# Patient Record
Sex: FEMALE | Race: WHITE | Employment: OTHER | URBAN - METROPOLITAN AREA
[De-identification: names, ages, dates, MRNs, and addresses within clinical notes are randomized per-mention and may not be internally consistent; named-entity substitution may affect disease eponyms.]

---

## 2017-04-23 ENCOUNTER — HOSPITAL ENCOUNTER (INPATIENT)
Age: 52
LOS: 3 days | Discharge: HOME | DRG: 274 | End: 2017-04-26
Attending: INTERNAL MEDICINE | Admitting: INTERNAL MEDICINE
Payer: COMMERCIAL

## 2017-04-23 PROBLEM — I21.4 NSTEMI (NON-ST ELEVATED MYOCARDIAL INFARCTION) (HCC): Status: ACTIVE | Noted: 2017-04-23

## 2017-04-23 LAB — TROPONIN I SERPL-MCNC: 1.19 NG/ML

## 2017-04-23 PROCEDURE — 74011250637 HC RX REV CODE- 250/637: Performed by: INTERNAL MEDICINE

## 2017-04-23 PROCEDURE — 84484 ASSAY OF TROPONIN QUANT: CPT | Performed by: INTERNAL MEDICINE

## 2017-04-23 PROCEDURE — 65660000000 HC RM CCU STEPDOWN

## 2017-04-23 PROCEDURE — 36415 COLL VENOUS BLD VENIPUNCTURE: CPT | Performed by: INTERNAL MEDICINE

## 2017-04-23 PROCEDURE — 99218 HC RM OBSERVATION: CPT

## 2017-04-23 PROCEDURE — 74011250636 HC RX REV CODE- 250/636: Performed by: INTERNAL MEDICINE

## 2017-04-23 PROCEDURE — 93005 ELECTROCARDIOGRAM TRACING: CPT

## 2017-04-23 RX ORDER — SODIUM CHLORIDE 0.9 % (FLUSH) 0.9 %
5-10 SYRINGE (ML) INJECTION EVERY 8 HOURS
Status: DISCONTINUED | OUTPATIENT
Start: 2017-04-23 | End: 2017-04-25

## 2017-04-23 RX ORDER — ONDANSETRON 2 MG/ML
4 INJECTION INTRAMUSCULAR; INTRAVENOUS
Status: DISCONTINUED | OUTPATIENT
Start: 2017-04-23 | End: 2017-04-26 | Stop reason: HOSPADM

## 2017-04-23 RX ORDER — ATORVASTATIN CALCIUM 40 MG/1
40 TABLET, FILM COATED ORAL
Status: DISCONTINUED | OUTPATIENT
Start: 2017-04-23 | End: 2017-04-26 | Stop reason: HOSPADM

## 2017-04-23 RX ORDER — CLOPIDOGREL BISULFATE 75 MG/1
75 TABLET ORAL DAILY
Status: DISCONTINUED | OUTPATIENT
Start: 2017-04-23 | End: 2017-04-24

## 2017-04-23 RX ORDER — ACETAMINOPHEN 325 MG/1
650 TABLET ORAL
Status: DISCONTINUED | OUTPATIENT
Start: 2017-04-23 | End: 2017-04-26 | Stop reason: HOSPADM

## 2017-04-23 RX ORDER — ENOXAPARIN SODIUM 100 MG/ML
40 INJECTION SUBCUTANEOUS EVERY 24 HOURS
Status: DISCONTINUED | OUTPATIENT
Start: 2017-04-24 | End: 2017-04-24

## 2017-04-23 RX ORDER — GUAIFENESIN 100 MG/5ML
81 LIQUID (ML) ORAL
Status: ACTIVE | OUTPATIENT
Start: 2017-04-23 | End: 2017-04-24

## 2017-04-23 RX ORDER — METOPROLOL TARTRATE 25 MG/1
25 TABLET, FILM COATED ORAL EVERY 12 HOURS
Status: DISCONTINUED | OUTPATIENT
Start: 2017-04-23 | End: 2017-04-24

## 2017-04-23 RX ORDER — SODIUM CHLORIDE 0.9 % (FLUSH) 0.9 %
5-10 SYRINGE (ML) INJECTION AS NEEDED
Status: DISCONTINUED | OUTPATIENT
Start: 2017-04-23 | End: 2017-04-25

## 2017-04-23 RX ORDER — SODIUM CHLORIDE 9 MG/ML
75 INJECTION, SOLUTION INTRAVENOUS CONTINUOUS
Status: DISCONTINUED | OUTPATIENT
Start: 2017-04-23 | End: 2017-04-25

## 2017-04-23 RX ADMIN — CLOPIDOGREL BISULFATE 75 MG: 75 TABLET ORAL at 22:47

## 2017-04-23 RX ADMIN — METOPROLOL TARTRATE 25 MG: 25 TABLET ORAL at 22:47

## 2017-04-23 RX ADMIN — SODIUM CHLORIDE 75 ML/HR: 900 INJECTION, SOLUTION INTRAVENOUS at 22:40

## 2017-04-23 RX ADMIN — Medication 10 ML: at 22:47

## 2017-04-23 NOTE — IP AVS SNAPSHOT
[address] [facility] 
[phone] Patient: La Fleming MRN: [id number] :1965 You are allergic to the following No active allergies Recent Documentation Height Weight BMI Smoking Status 1.753 m 94.7 kg 30.83 kg/m2 Never Smoker Emergency Contacts Name Discharge Info Relation Home Work Mobile Morgan Duel DISCHARGE CAREGIVER [3] Spouse [3] [phone] About your hospitalization You were admitted on:  2017 You last received care in the:  [facility] CARDIO You were discharged on:  2017 Unit phone number:  [phone] Why you were hospitalized Your primary diagnosis was:  Not on File Your diagnoses also included:  Nstemi (Non-St Elevated Myocardial Infarction) ([facility]) Providers Seen During Your Hospitalizations Provider Role Specialty Primary office phone Lexy Palomino MD Attending Provider Internal Medicine [phone] Denver Moris, MD Attending Provider Internal Medicine [phone] Jessica Simpson MD Attending Provider Hospitalist [phone] Your Primary Care Physician (PCP) Primary Care Physician Office Phone Office Fax UNKNOWN, PROVIDER ** None ** ** None ** Follow-up Information Follow up With Details Comments Contact Info Schedule an appointment as soon as possible for a visit  Cardiologist in [place] in 3-4 weeks Current Discharge Medication List  
  
CONTINUE these medications which have CHANGED Dose & Instructions Dispensing Information Comments Morning Noon Evening Bedtime  
 metoprolol succinate 25 mg XL tablet Commonly known as:  TOPROL-XL What changed:   
- medication strength 
- how much to take Your last dose was: Your next dose is:    
   
   
 Dose:  25 mg Take 1 Tab by mouth daily. Quantity:  30 Tab Refills:  1 CONTINUE these medications which have NOT CHANGED Dose & Instructions Dispensing Information Comments Morning Noon Evening Bedtime FISH OIL 1,000 mg Cap Generic drug:  omega-3 fatty acids-vitamin e Your last dose was: Your next dose is:    
   
   
 Dose:  1 Cap Take 1 Cap by mouth two (2) times a day. Refills:  0  
     
   
   
   
  
 multivitamin tablet Commonly known as:  ONE A DAY Your last dose was: Your next dose is:    
   
   
 Dose:  1 Tab Take 1 Tab by mouth daily. Refills:  0 PLAVIX 75 mg Tab Generic drug:  clopidogrel Your last dose was: Your next dose is:    
   
   
 Dose:  75 mg Take 75 mg by mouth daily. Refills:  0 Potassium Citrate 15 mEq Blease Mash Your last dose was: Your next dose is: Take  by mouth. Refills:  0  
     
   
   
   
  
 RED YEAST RICE PO Your last dose was: Your next dose is: Take  by mouth two (2) times a day. Refills:  0 STOP taking these medications   
 aspirin 81 mg chewable tablet Where to Get Your Medications Information on where to get these meds will be given to you by the nurse or doctor. ! Ask your nurse or doctor about these medications  
  metoprolol succinate 25 mg XL tablet Discharge Instructions POST-EP STUDY AND ABLATION DISCHARGE INSTRUCTIONS: 
 
You had a supraventricular tachycardia (SVT) ablation by Dr. Mozelle Burkitt on 4/25. This was for a problem called AV node reentry. Do not drive, operate any machinery, or sign any legal documents for 24 hours after your procedure. You must have someone to drive you home. You may take a shower 24 hours after your cardiac procedure.   Be sure to get the dressing wet and then remove it; gently wash the area with warm soapy water. Pat dry and leave open to air. To help prevent infections, be sure to keep the cath site clean and dry. No lotions, creams, powders, ointments, etc. in the cath site for approximately 1 week. ? Do not take a tub bath, get in a hot tub or swimming pool for approximately 5 days or until the cath site is completely healed. ? No strenuous activity or heavy lifting over 20 lbs. for 7 days. ? After your procedure, some bruising or discomfort is common during the healing process. Tylenol, 1-2 tablets every 6 hours as needed, is recommended if you experience any discomfort. If you experience any signs or symptoms of infection such as fever, chills, or poorly healing incision, persistent tenderness or swelling in the groin, redness and/or warmth to the touch, numbness, significant tingling or pain at the groin site or affected extremity, rash, drainage from the site, or if the leg feels tight or swollen, call your physician right away. ? If bleeding at the site occurs, take a clean gauze pad and apply direct pressure to the groin just above the puncture site, and call your physician right away. ? If your procedure involved ablation therapy, you may feel some mild or vague chest discomfort due to delivery of heat therapy to the heart muscle. This should resolve in 1-2 days. If it gets worse or is associated with shortness of breath, dizziness, loss of consciousness, call your physician right away or call 911 if emergency medical care is needed. Discharge Orders None [facility] Announcement We are excited to announce that we are making your provider's discharge notes available to you in [employer]. You will see these notes when they are completed and signed by the physician that discharged you from your recent hospital stay.   If you have any questions or concerns about any information you see in [employer], please call the Health Information Department where you were seen or reach out to your Primary Care Provider for more information about your plan of care. Introducing [facility] [place] [facility]! Dear Miguel A Montez: 
Thank you for requesting a [employer] account. Our records indicate that you already have an active [employer] account. You can access your account anytime at [email or web address]. [email or web address] Did you know that you can access your hospital and ER discharge instructions at any time in [employer]? You can also review all of your test results from your hospital stay or ER visit. Additional Information If you have questions, please visit the Frequently Asked Questions section of the [employer] website at [email or web address]. [email or web address] Remember, [employer] is NOT to be used for urgent needs. For medical emergencies, dial 911. Now available from your iPhone and Android! General Information Please provide this summary of care documentation to your next provider. Patient Signature:  ____________________________________________________________ Date:  ____________________________________________________________  
  
Demond Larson Provider Signature:  ____________________________________________________________ Date:  ____________________________________________________________

## 2017-04-23 NOTE — IP AVS SNAPSHOT
Current Discharge Medication List  
  
CONTINUE these medications which have CHANGED Dose & Instructions Dispensing Information Comments Morning Noon Evening Bedtime  
 metoprolol succinate 25 mg XL tablet Commonly known as:  TOPROL-XL What changed:   
- medication strength 
- how much to take Your last dose was: Your next dose is:    
   
   
 Dose:  25 mg Take 1 Tab by mouth daily. Quantity:  30 Tab Refills:  1 CONTINUE these medications which have NOT CHANGED Dose & Instructions Dispensing Information Comments Morning Noon Evening Bedtime FISH OIL 1,000 mg Cap Generic drug:  omega-3 fatty acids-vitamin e Your last dose was: Your next dose is:    
   
   
 Dose:  1 Cap Take 1 Cap by mouth two (2) times a day. Refills:  0  
     
   
   
   
  
 multivitamin tablet Commonly known as:  ONE A DAY Your last dose was: Your next dose is:    
   
   
 Dose:  1 Tab Take 1 Tab by mouth daily. Refills:  0 PLAVIX 75 mg Tab Generic drug:  clopidogrel Your last dose was: Your next dose is:    
   
   
 Dose:  75 mg Take 75 mg by mouth daily. Refills:  0 Potassium Citrate 15 mEq Chanelle Buster Your last dose was: Your next dose is: Take  by mouth. Refills:  0  
     
   
   
   
  
 RED YEAST RICE PO Your last dose was: Your next dose is: Take  by mouth two (2) times a day. Refills:  0 STOP taking these medications   
 aspirin 81 mg chewable tablet Where to Get Your Medications Information on where to get these meds will be given to you by the nurse or doctor. ! Ask your nurse or doctor about these medications  
  metoprolol succinate 25 mg XL tablet

## 2017-04-23 NOTE — IP AVS SNAPSHOT
Summary of Care Report The Summary of Care report has been created to help improve care coordination. Users with access to [employer] or [address] (Web-based application) may access additional patient information including the Discharge Summary. If you are not currently a [address] user and need more information, please call the number listed below in the [address] section and ask to be connected with Medical Records. Facility Information Name Address Phone [address] [address] [phone] Patient Information Patient Name Sex  Az Sutton ([id number]) Female 1965 Discharge Information Admitting Provider Service Area Unit Courtney Mederos MD / [facility] / [phone] Discharge Provider Discharge Date/Time Discharge Disposition Destination (none) 2017 (Pending) AHR (none) Patient Language Language ENGLISH [13] Hospital Problems as of 2017  Never Reviewed Class Noted - Resolved Last Modified POA Active Problems NSTEMI (non-ST elevated myocardial infarction) ([facility] [address]  2017 - Present 2017 by Courtney Mederos MD Unknown Entered by Courtney Mederos MD  
  
Non-Hospital Problems as of 2017  Never Reviewed Class Noted - Resolved Last Modified Active Problems Palpitations  3/29/2013 - Present 3/30/2013 Entered by Raul Nash MD  
  Paroxysmal ventricular tachycardia [facility])  Unknown - Present 2013 by Chuck Valdes MD  
  Entered by Chuck Valdes MD  
  MVP (mitral valve prolapse)  Unknown - Present 2013 by Chuck Valdes MD  
  Entered by Chuck Valdes MD  
  
You are allergic to the following No active allergies Current Discharge Medication List  
  
 CONTINUE these medications which have CHANGED Dose & Instructions Dispensing Information Comments  
 metoprolol succinate 25 mg XL tablet Commonly known as:  TOPROL-XL What changed:   
- medication strength 
- how much to take Dose:  25 mg Take 1 Tab by mouth daily. Quantity:  30 Tab Refills:  1 CONTINUE these medications which have NOT CHANGED Dose & Instructions Dispensing Information Comments FISH OIL 1,000 mg Cap Generic drug:  omega-3 fatty acids-vitamin e Dose:  1 Cap Take 1 Cap by mouth two (2) times a day. Refills:  0  
   
 multivitamin tablet Commonly known as:  ONE A DAY Dose:  1 Tab Take 1 Tab by mouth daily. Refills:  0 PLAVIX 75 mg Tab Generic drug:  clopidogrel Dose:  75 mg Take 75 mg by mouth daily. Refills:  0 Potassium Citrate 15 mEq Marylee Tara Take  by mouth. Refills:  0  
   
 RED YEAST RICE PO Take  by mouth two (2) times a day. Refills:  0 STOP taking these medications Comments  
 aspirin 81 mg chewable tablet Surgery Information ID Date/Time Status Primary Surgeon All Procedures Location [id number] 4/25/2017 Complete   DILAN TOMLINSON - DO NOT SCHEDULE Follow-up Information Follow up With Details Comments Contact Info Schedule an appointment as soon as possible for a visit  Cardiologist in [place] in 3-4 weeks Discharge Instructions POST-EP STUDY AND ABLATION DISCHARGE INSTRUCTIONS: 
 
You had a supraventricular tachycardia (SVT) ablation by Dr. Karlee Hawley on 4/25. This was for a problem called AV node reentry. Do not drive, operate any machinery, or sign any legal documents for 24 hours after your procedure. You must have someone to drive you home. You may take a shower 24 hours after your cardiac procedure.   Be sure to get the dressing wet and then remove it; gently wash the area with warm soapy water. Pat dry and leave open to air. To help prevent infections, be sure to keep the cath site clean and dry. No lotions, creams, powders, ointments, etc. in the cath site for approximately 1 week. ? Do not take a tub bath, get in a hot tub or swimming pool for approximately 5 days or until the cath site is completely healed. ? No strenuous activity or heavy lifting over 20 lbs. for 7 days. ? After your procedure, some bruising or discomfort is common during the healing process. Tylenol, 1-2 tablets every 6 hours as needed, is recommended if you experience any discomfort. If you experience any signs or symptoms of infection such as fever, chills, or poorly healing incision, persistent tenderness or swelling in the groin, redness and/or warmth to the touch, numbness, significant tingling or pain at the groin site or affected extremity, rash, drainage from the site, or if the leg feels tight or swollen, call your physician right away. ? If bleeding at the site occurs, take a clean gauze pad and apply direct pressure to the groin just above the puncture site, and call your physician right away. ? If your procedure involved ablation therapy, you may feel some mild or vague chest discomfort due to delivery of heat therapy to the heart muscle. This should resolve in 1-2 days. If it gets worse or is associated with shortness of breath, dizziness, loss of consciousness, call your physician right away or call 911 if emergency medical care is needed. Chart Review Routing History No Routing History on File

## 2017-04-24 LAB
ANION GAP BLD CALC-SCNC: 11 MMOL/L (ref 5–15)
ATRIAL RATE: 89 BPM
BUN SERPL-MCNC: 9 MG/DL (ref 6–20)
BUN/CREAT SERPL: 11 (ref 12–20)
CALCIUM SERPL-MCNC: 7.7 MG/DL (ref 8.5–10.1)
CALCULATED P AXIS, ECG09: 51 DEGREES
CALCULATED R AXIS, ECG10: -3 DEGREES
CALCULATED T AXIS, ECG11: 2 DEGREES
CHLORIDE SERPL-SCNC: 112 MMOL/L (ref 97–108)
CHOLEST SERPL-MCNC: 200 MG/DL
CO2 SERPL-SCNC: 23 MMOL/L (ref 21–32)
CREAT SERPL-MCNC: 0.85 MG/DL (ref 0.55–1.02)
DIAGNOSIS, 93000: NORMAL
ERYTHROCYTE [DISTWIDTH] IN BLOOD BY AUTOMATED COUNT: 13.5 % (ref 11.5–14.5)
EST. AVERAGE GLUCOSE BLD GHB EST-MCNC: 111 MG/DL
GLUCOSE SERPL-MCNC: 89 MG/DL (ref 65–100)
HBA1C MFR BLD: 5.5 % (ref 4.2–6.3)
HCT VFR BLD AUTO: 37.6 % (ref 35–47)
HDLC SERPL-MCNC: 78 MG/DL
HDLC SERPL: 2.6 {RATIO} (ref 0–5)
HGB BLD-MCNC: 11.8 G/DL (ref 11.5–16)
LDLC SERPL CALC-MCNC: 95.4 MG/DL (ref 0–100)
LIPID PROFILE,FLP: ABNORMAL
MCH RBC QN AUTO: 27 PG (ref 26–34)
MCHC RBC AUTO-ENTMCNC: 31.4 G/DL (ref 30–36.5)
MCV RBC AUTO: 86 FL (ref 80–99)
P-R INTERVAL, ECG05: 128 MS
PLATELET # BLD AUTO: 149 K/UL (ref 150–400)
POTASSIUM SERPL-SCNC: 3.6 MMOL/L (ref 3.5–5.1)
Q-T INTERVAL, ECG07: 368 MS
QRS DURATION, ECG06: 84 MS
QTC CALCULATION (BEZET), ECG08: 447 MS
RBC # BLD AUTO: 4.37 M/UL (ref 3.8–5.2)
SODIUM SERPL-SCNC: 146 MMOL/L (ref 136–145)
TRIGL SERPL-MCNC: 133 MG/DL (ref ?–150)
TROPONIN I SERPL-MCNC: 0.58 NG/ML
TROPONIN I SERPL-MCNC: 1.02 NG/ML
TSH SERPL DL<=0.05 MIU/L-ACNC: 1.24 UIU/ML (ref 0.36–3.74)
VENTRICULAR RATE, ECG03: 89 BPM
VLDLC SERPL CALC-MCNC: 26.6 MG/DL
WBC # BLD AUTO: 5.3 K/UL (ref 3.6–11)

## 2017-04-24 PROCEDURE — 80048 BASIC METABOLIC PNL TOTAL CA: CPT | Performed by: INTERNAL MEDICINE

## 2017-04-24 PROCEDURE — 85027 COMPLETE CBC AUTOMATED: CPT | Performed by: INTERNAL MEDICINE

## 2017-04-24 PROCEDURE — 84443 ASSAY THYROID STIM HORMONE: CPT | Performed by: INTERNAL MEDICINE

## 2017-04-24 PROCEDURE — 74011250636 HC RX REV CODE- 250/636: Performed by: INTERNAL MEDICINE

## 2017-04-24 PROCEDURE — 65660000000 HC RM CCU STEPDOWN

## 2017-04-24 PROCEDURE — 84484 ASSAY OF TROPONIN QUANT: CPT | Performed by: INTERNAL MEDICINE

## 2017-04-24 PROCEDURE — 80061 LIPID PANEL: CPT | Performed by: INTERNAL MEDICINE

## 2017-04-24 PROCEDURE — 36415 COLL VENOUS BLD VENIPUNCTURE: CPT | Performed by: INTERNAL MEDICINE

## 2017-04-24 PROCEDURE — 83036 HEMOGLOBIN GLYCOSYLATED A1C: CPT | Performed by: INTERNAL MEDICINE

## 2017-04-24 PROCEDURE — 74011250637 HC RX REV CODE- 250/637: Performed by: INTERNAL MEDICINE

## 2017-04-24 RX ADMIN — SODIUM CHLORIDE 75 ML/HR: 900 INJECTION, SOLUTION INTRAVENOUS at 10:50

## 2017-04-24 RX ADMIN — Medication 10 ML: at 22:51

## 2017-04-24 RX ADMIN — SODIUM CHLORIDE 75 ML/HR: 900 INJECTION, SOLUTION INTRAVENOUS at 22:54

## 2017-04-24 RX ADMIN — METOPROLOL TARTRATE 25 MG: 25 TABLET ORAL at 09:17

## 2017-04-24 RX ADMIN — Medication 10 ML: at 03:24

## 2017-04-24 NOTE — CONSULTS
CARDIOLOGY CONSULT    Patient ID:  Patient: Wagner Ball  MRN: [id number]  Age: 45 y.o.  : 1965    Date of  Admission: 2017  8:36 PM   PCP:  Not On File [facility]    Assessment: 1. Paroxysmal SVT, symptomatic. Narrow complex tachycardia with hidden atrial activity >200 bpm.  Worse burden lately. 2. Positive troponin due to type 2 NSTEMI during prolonged tachycardia. Peak troponin 1.19. No obstructive CAD on cath in . 3. Mitral valve prolapse with MR diagnosis. 4. History of remote TIA/stroke. Says she had hypercoagulability work-up. Plan:     1. I discussed the potential benefits, risks, and alternatives to EP study with cardiac ablation. This would be preferred to the other options of pharmacologic prophylaxis (digoxin and metoprolol) and doing nothing at all. She and  will talk this over and decide whether they will pursue this here or head back [facility] on digoxin and metoprolol and arrange shortly with a local cardiologist to them. 2. Echo today. 3. Plavix alone OK for now. We discussed the rationale for dual antiplatelet therapy and antithrombotic therapy. I suspect the chance she has paroxysmal atrial fibrillation or flutter is very small. Further evaluation and treatment pending her decision-making as above. [x]       High complexity decision making was performed in this patient. Wagner Ball is a 45 y.o. female with a history of paroxysmal supraventricular tachycardia. She has had intermittent episodes for years and years. Usually episodes last a minute or so. May occur daily for a week then not for six months. Lately, her last two episodes have been longer, each lasting >1 hr.  She had chest pain, palpitations, dizziness with the last episode, came to the ER and had spontaneous termination before a drug could be given. Active, no orthopnea, PND, edema, No rocky syncope. Lives on [address].   Was due to travel to [place] this week ( to travel [facility]) but these trips have been postponed. Past Medical History:   Diagnosis Date    B12 deficiency     Congenital hip dysplasia     Hip replacement    MVP (mitral valve prolapse)     Paroxysmal supraventricular tachycardia (HCC)     Stroke [facility])         Past Surgical History:   Procedure Laterality Date    HX GI      gallbladder    HX GYN          HX HEART CATHETERIZATION  3/2013    Normal coronaries, small LAD, EF 65%, MVP, mild to mod MR    HX ORTHOPAEDIC      scoliosis surgery, L hip replacement       Social History   Substance Use Topics    Smoking status: Never Smoker    Smokeless tobacco: Not on file    Alcohol use Not on file        Family History   Problem Relation Age of Onset    Arrhythmia Father         No Known Allergies       Current Facility-Administered Medications   Medication Dose Route Frequency    clopidogrel (PLAVIX) tablet 75 mg  75 mg Oral DAILY    sodium chloride (NS) flush 5-10 mL  5-10 mL IntraVENous Q8H    sodium chloride (NS) flush 5-10 mL  5-10 mL IntraVENous PRN    0.9% sodium chloride infusion  75 mL/hr IntraVENous CONTINUOUS    acetaminophen (TYLENOL) tablet 650 mg  650 mg Oral Q4H PRN    ondansetron (ZOFRAN) injection 4 mg  4 mg IntraVENous Q4H PRN    enoxaparin (LOVENOX) injection 40 mg  40 mg SubCUTAneous Q24H    metoprolol tartrate (LOPRESSOR) tablet 25 mg  25 mg Oral Q12H    atorvastatin (LIPITOR) tablet 40 mg  40 mg Oral QHS       Review of Symptoms:  See HPI as well.   General: negative for fever, chills, sweats, weakness, weight loss   Eyes: negative for blurred vision, eye pain, loss of vision, diplopia   Ear Nose and Throat: negative for speech or swallowing difficulties   Respiratory: negative for cough, sputum production, wheezing, pleuritic pain   Gastrointestinal: negative for abdominal pain, N/V, dysphagia, change in bowel habits, bleeding   Genitourinary: negative for dysuria, hematuria, incontinence Muskuloskeletal : negative for new arthralgia, myalgia   Hematology: negative for easy bruising, bleeding  Dermatological: negative for rash, ulceration, mole change   Endocrine: negative for hot flashes or polydipsia   Neurological: negative for headache, dizziness, confusion, focal weakness, paresthesia, memory loss, gait disturbance   Psychological: negative for anxiety, depression, agitation       Objective:      Physical Exam:  Temp (24hrs), Av °F (36.7 °C), Min:97.3 °F (36.3 °C), Max:98.5 °F (36.9 °C)    Patient Vitals for the past 8 hrs:   Pulse   17 1035 69   17 0804 98   17 0800 (!) 115   17 0707 84   17 0440 72    Patient Vitals for the past 8 hrs:   Resp   17 1035 18   17 0707 18   17 0440 18    Patient Vitals for the past 8 hrs:   BP   17 1035 136/72   17 0707 134/82   17 0440 121/65        Intake/Output Summary (Last 24 hours) at 17 1113  Last data filed at 17 0440   Gross per 24 hour   Intake              850 ml   Output                0 ml   Net              850 ml       Nondiaphoretic, not in acute distress. Supple, no palpable thyromegaly. No scleral icterus, mucous membranes moist, conjuctivae pink, no xanthelasma. Unlabored, clear to auscultation bilaterally, symmetric air movement. Regular rate and rhythm, no murmur, pericardial rub, knock, or gallop. No JVD or peripheral edema. No carotid bruit. Palpable radial pulses bilaterally. Abdomen, soft, nontender, nondistended. Extremities without cyanosis or clubbing. Muscle tone and bulk normal.  Skin warm and dry. No rashes or ulcers. Neuro grossly nonfocal.  No tremor. Awake and appropriate. CARDIOGRAPHICS and STUDIES, I reviewed:    Telemetry:  SR.    ECG's reviewed. Initial shows narrow complex tachycardia 203 bpm with unclear A activity. Subsequent ECG's show SR with PAC, PVC. No preexcitation. Echo:  PENDING.        Labs:  Recent Labs 04/24/17   0424  04/23/17   2226  04/23/17   1400  04/23/17   1100   CPK   --    --    --   59   CKMB   --    --    --   <1.0   CKNDX   --    --    --   Cannot be calulated   TROIQ  1.02*  1.19*  0.71*  0.22*     Lab Results   Component Value Date/Time    Cholesterol, total 200 04/24/2017 04:24 AM    HDL Cholesterol 78 04/24/2017 04:24 AM    LDL, calculated 95.4 04/24/2017 04:24 AM    Triglyceride 133 04/24/2017 04:24 AM    CHOL/HDL Ratio 2.6 04/24/2017 04:24 AM     No results for input(s): INR, PTP, APTT in the last 72 hours. No lab exists for component: INREXT   Recent Labs      04/24/17   0424  04/23/17   1100   NA  146*  143   K  3.6  4.1   CL  112*  104   CO2  23  25   BUN  9  11   CREA  0.85  1.31*   GLU  89  135*   CA  7.7*  8.8   ALB   --   3.8   WBC  5.3  7.1   HGB  11.8  14.0   HCT  37.6  43.2   PLT  149*  207     Recent Labs      04/23/17   1100   SGOT  18   AP  69   TP  7.2   ALB  3.8   GLOB  3.4     No components found for: GLPOC  No results for input(s): PH, PCO2, PO2 in the last 72 hours.         Saritha Walton MD  4/24/2017

## 2017-04-24 NOTE — PROGRESS NOTES
Hospitalist Progress Note    NAME: Elder Clay   :  1965   MRN:  [id number]       Interim Hospital Summary: 45 y.o. female whom presented on 2017 with      Assessment / Plan:    Narrow complex tachycardia likely Paroxysmal SVT, symptomatic-now resolved  Elevated troponin peaked to 1.08 and now trending down 0.58  H/o of MVP  -cont tele monitoring  -check LDL, A1C, TSH  -on ASA and plavix. -started on metoprolol bid, on atorvastatin  -pending ECHO  -appreciate cardiology eval, plan for ablation tomorrow     Elevated lactic 3.2 -> 1.5  Likely FROM Dehydration  -creatinine at 1.31 to 0.80  -lactic resolved following 1L NS bolus at OSH  -gentle IVF     Hx of CVA  -no deficits  -continue pta plavix.     Code Status: full  Surrogate Decision Maker:   DVT Prophylaxis: lovenox  GI Prophylaxis: not indicated  Baseline: independent         Subjective:     Chief Complaint / Reason for Physician Visit  \" i am completely fine\". Discussed with RN events overnight. Review of Systems:  Symptom Y/N Comments  Symptom Y/N Comments   Fever/Chills n   Chest Pain n    Poor Appetite n   Edema n    Cough n   Abdominal Pain n    Sputum n   Joint Pain n    SOB/FERNANDES n   Pruritis/Rash n    Nausea/vomit n   Tolerating PT/OT y    Diarrhea n   Tolerating Diet y    Constipation    Other       Could NOT obtain due to:      Objective:     VITALS:   Last 24hrs VS reviewed since prior progress note.  Most recent are:  Patient Vitals for the past 24 hrs:   Temp Pulse Resp BP SpO2   17 1035 97.3 °F (36.3 °C) 69 18 136/72 100 %   17 0804 - 98 - - -   17 0800 - (!) 115 - - -   17 0707 98 °F (36.7 °C) 84 18 134/82 99 %   17 0440 98.5 °F (36.9 °C) 72 18 121/65 97 %   17 2226 98.1 °F (36.7 °C) 89 18 117/63 96 %       Intake/Output Summary (Last 24 hours) at 17 1436  Last data filed at 17 0440   Gross per 24 hour   Intake              850 ml   Output                0 ml   Net 850 ml        PHYSICAL EXAM:  General: WD, WN. Alert, cooperative, no acute distress    EENT:  EOMI. Anicteric sclerae. MMM  Resp:  CTA bilaterally, no wheezing or rales. No accessory muscle use  CV:  Regular  rhythm,  No edema  GI:  Soft, Non distended, Non tender.  +Bowel sounds  Neurologic:  Alert and oriented X 3, normal speech,   Psych:   Good insight. Not anxious nor agitated  Skin:  No rashes. No jaundice    Reviewed most current lab test results and cultures  YES  Reviewed most current radiology test results   YES  Review and summation of old records today    NO  Reviewed patient's current orders and MAR    YES  PMH/SH reviewed - no change compared to H&P  ________________________________________________________________________  Care Plan discussed with:    Comments   Patient x    Family  x    RN     Care Manager     Consultant  x Dr. Dolan Handsome team rounds were held today with , nursing, pharmacist and clinical coordinator. Patient's plan of care was discussed; medications were reviewed and discharge planning was addressed. ________________________________________________________________________  Total NON critical care TIME: 35   Minutes    Total CRITICAL CARE TIME Spent:   Minutes non procedure based      Comments   >50% of visit spent in counseling and coordination of care     ________________________________________________________________________  Buffy Farmer MD     Procedures: see electronic medical records for all procedures/Xrays and details which were not copied into this note but were reviewed prior to creation of Plan. LABS:  I reviewed today's most current labs and imaging studies.   Pertinent labs include:  Recent Labs      04/24/17   0424  04/23/17   1100   WBC  5.3  7.1   HGB  11.8  14.0   HCT  37.6  43.2   PLT  149*  207     Recent Labs      04/24/17   0424  04/23/17   1100   NA  146*  143   K  3.6  4.1   CL  112*  104 CO2  23  25   GLU  89  135*   BUN  9  11   CREA  0.85  1.31*   CA  7.7*  8.8   ALB   --   3.8   TBILI   --   0.5   SGOT   --   18   ALT   --   25       Signed: Tawanna Young MD

## 2017-04-24 NOTE — PROGRESS NOTES
She wants to pursue ablation tomorrow. We'll set this up. Will likely be an afternoon case. Will d/c metoprolol to maximize inducibility of SVT. Enoxaparin (DVT proph) and Plavix held.

## 2017-04-24 NOTE — PROGRESS NOTES
Primary Nurse Ritika Antony RN and Mell Pettit RN performed a dual skin assessment on this patient No impairment noted  Everton score is 22        [facility] SHIFT NURSING NOTE    Bedside shift change report given to [address] (oncoming nurse) by Lucia Berger (offgoing nurse). Report included the following information SBAR, Kardex, Intake/Output, MAR and Recent Results. SHIFT SUMMARY:         Admission Date 4/23/2017   Admission Diagnosis Elevated Troponin   Consults None        Consults   [] PT   [] OT   [] Speech   [] Palliative      [] Hospice    [x] Case Management   [] None   Cardiac Monitoring   [x] Yes   [] No     Antibiotics   [] Yes   [x] No   GI Prophylaxis  (Ex: Protonix, Pepcid, etc,.)   [] Yes   [x] No          DVT Prophylaxis   SCDs:             Kolby stockings:         [x] Medication (Ex: Lovenox, Eliquis, Brilinta, Coumadin,  Heparin, etc..)   [] Contraindicated   [] No VTE needed       Urinary Catheter             LDAs               Peripheral IV 04/23/17 Left Antecubital (Active)   Site Assessment Clean, dry, & intact 4/23/2017  8:38 PM   Phlebitis Assessment 0 4/23/2017  8:38 PM   Infiltration Assessment 0 4/23/2017  8:38 PM   Dressing Status Clean, dry, & intact 4/23/2017  8:38 PM   Dressing Type Transparent 4/23/2017  8:38 PM   Hub Color/Line Status Green;Capped 4/23/2017  8:38 PM                      I/Os   Intake/Output Summary (Last 24 hours) at 04/23/17 2041  Last data filed at 04/23/17 2038   Gross per 24 hour   Intake                0 ml   Output                0 ml   Net                0 ml         Activity Level           Diet Active Orders   There are no active orders of the following type(s): Diet. Purposeful Rounding every 1-2 hour?    [x] Yes    Celine Score  Total Score: 1   Bed Alarm (If score 3 or >)   [] Yes    [] Refused (See signed refusal form in chart)   Everton Score  Everton Score: 22       Everton Score (if score 14 or less)   [] PMT consult   [] Nutrition consult   [] Wound Care consult      []  Specialty bed         Influenza Vaccine                 Needs prior to discharge:   Home O2 required:    [] Yes   [x] No     If yes, how much O2 required?     Other:        Readmission Risk Assessment Tool Score Low Risk            4       Total Score        4 More than 1 Admission in calendar year        Criteria that do not apply:    Relationship with PCP    Patient Living Status    Patient Length of Stay > 5    Patient Insurance is Medicare, Medicaid or Self Pay    Charlson Comorbidity Score       Expected Length of Stay - - -   Actual Length of Stay 0

## 2017-04-24 NOTE — H&P
Hospitalist Admission Note    NAME: Uri Mojica   :  1965   MRN:  [id number]     Date/Time:  2017 9:18 PM    Patient PCP: Not On File Bsi  ________________________________________________________________________    My assessment of this patient's clinical condition and my plan of care is as follows. Assessment / Plan:  Elevated troponin 0.22-> 0.71  SVT, resolved   Hx of MVP  -Presented to OSH with chest pressure with radiation to bilateral neck and right shoulder with associated nausea, diaphoresis, and palpitations. Symptoms lasted two hours and resolved following ED arrival at [address] [place] [address]. Transferred to [facility] for rising troponin. She has a history of SVT with prior ILR. Not on BB. Takes plavix daily. Last cath in  without evidence of CAD. -Currently without symptoms. Repeat EKG. -Admit to telemetry  -trend troponin  -check LDL, A1C, TSH  -Start ASA 81mg daily, continue 75mg plavix daily. Give tonight as she missed morning meds. -start 25mg metoprolol bid, start 40mg atorvastatin qhs  -Check ECHO  -Hold on therapeutic anticoagulation for now as troponin leak suspected secondary to supply/demand mismatch in setting of SVT at OSH. DVT proph lovenox for now.  -consult to cardiology, Discussed case with Dr. Phuong Hu. Elevated lactic 3.2 -> 1.5  Dehydration  -creatinine at 1.31, baseline not known.  -in setting of SVT and dehydration  -lactic resolved following 1L NS bolus at OSH  -gentle IVF  -Follow UOP and BMP in am.    Hx of CVA  -no deficits  -continue pta plavix. Code Status: full  Surrogate Decision Maker:   DVT Prophylaxis: lovenox  GI Prophylaxis: not indicated  Baseline: independent        Subjective:   CHIEF COMPLAINT: palpitations, chest pain    HISTORY OF PRESENT ILLNESS:     Elsie Mullins is a 45 y.o.  female with reported PMH of CVA without residual deficits, MVP and SVT. She presents as a transfer from [address] [place] [address] for elevated troponin. Patient reports symptoms started at around 9:30 this morning while in a meeting. Reports having chest pressure with radiation to bilateral neck and right shoulder with associated lightheadedness, nausea, diaphoresis, and palpitations. Symptoms lasted two hours and resolved following ED arrival at [address] [place] [address]. Reports history of similar episodes for the past 6 years in the past with negative work up. She lives in [place] and says she has seen multiple cardiologists with previoius ILR for a year without events. Not currently taking a BB. Was admitted in 2013 for arrhythmia with elevated troponin and had a negative cath at that time. At [address] [place] [address] initial EKG showed SVT with rate of ~200. Initial troponin was 0.22 with repeat of 0.71. Patient reports her SVT resolved spontaneously while at [address] [place] [address] ED, Currently NSR. She received 1L NS and 25mg PO metoprolol prior to transfer to [facility]. On arrival to [facility] patient reports symptoms have been resolved since this afternoon. Vitals are stable. HR wnl. Denies CP, N/V, SOB, or palpitations. Cardiology consult placed and case discussed with Dr. Mozelle Burkitt. We were asked to admit for work up and evaluation of the above problems.      Past Medical History:   Diagnosis Date    B12 deficiency     Congenital hip dysplasia     Hip replacement    MVP (mitral valve prolapse)     Paroxysmal ventricular tachycardia ([facility] [address] 2012    Stroke [facility])         Past Surgical History:   Procedure Laterality Date    HX GI      gallbladder    HX GYN          HX HEART CATHETERIZATION  3/2013    Normal coronaries, small LAD, EF 65%, MVP, mild to mod MR    HX ORTHOPAEDIC      scoliosis surgery, L hip replacement       Social History   Substance Use Topics    Smoking status: Never Smoker    Smokeless tobacco: Not on file    Alcohol use Not on file        Family History   Problem Relation Age of Onset    Arrhythmia Father      No Known Allergies     Prior to Admission medications    Medication Sig Start Date End Date Taking? Authorizing Provider   Potassium Citrate 15 mEq TbER Take  by mouth. Billie Laguna MD   metoprolol-XL (TOPROL-XL) 50 mg XL tablet Take 1 Tab by mouth daily. 3/30/13   Lucille Rodas MD   aspirin 81 mg chewable tablet Take 1 Tab by mouth daily. 3/30/13   Lucille Rodas MD   clopidogrel (PLAVIX) 75 mg tablet Take 75 mg by mouth daily. Historical Provider   multivitamin (ONE A DAY) tablet Take 1 Tab by mouth daily. Historical Provider   omega-3 fatty acids-vitamin e (FISH OIL) 1,000 mg cap Take 1 Cap by mouth two (2) times a day. Historical Provider   RED YEAST RICE PO Take  by mouth two (2) times a day. Historical Provider       REVIEW OF SYSTEMS:     I am not able to complete the review of systems because:    The patient is intubated and sedated    The patient has altered mental status due to his acute medical problems    The patient has baseline aphasia from prior stroke(s)    The patient has baseline dementia and is not reliable historian    The patient is in acute medical distress and unable to provide information           Total of 12 systems reviewed as follows:       POSITIVE= underlined text  Negative = text not underlined  General:  fever, chills, sweats, generalized weakness, weight loss/gain,      loss of appetite   Eyes:    blurred vision, eye pain, loss of vision, double vision  ENT:    rhinorrhea, pharyngitis   Respiratory:   cough, sputum production, SOB, FERNANDES, wheezing, pleuritic pain   Cardiology:   chest pain, palpitations, orthopnea, PND, edema, syncope   Gastrointestinal:  abdominal pain , N/V, diarrhea, dysphagia, constipation, bleeding   Genitourinary:  frequency, urgency, dysuria, hematuria, incontinence   Muskuloskeletal :  arthralgia, myalgia, back pain  Hematology:  easy bruising, nose or gum bleeding, lymphadenopathy   Dermatological: rash, ulceration, pruritis, color change / jaundice  Endocrine:   hot flashes or polydipsia   Neurological:  headache, dizziness, confusion, focal weakness, paresthesia,     Speech difficulties, memory loss, gait difficulty  Psychological: Feelings of anxiety, depression, agitation    Objective:   VITALS:    Visit Vitals    Ht 5' 9\" (1.753 m)    Wt 94.7 kg (208 lb 12.4 oz)    BMI 30.83 kg/m2       PHYSICAL EXAM:    General:    Alert, cooperative, no distress, appears stated age. HEENT: Atraumatic, anicteric sclerae, pink conjunctivae     No oral ulcers, mucosa moist, throat clear, dentition fair  Neck:  Supple, symmetrical,  thyroid: non tender  Lungs:   Clear to auscultation bilaterally. No Wheezing or Rhonchi. No rales. Chest wall:  No tenderness  No Accessory muscle use. Heart:   Regular  rhythm,  No  murmur   No edema  Abdomen:   Soft, non-tender. Not distended. Bowel sounds normal  Extremities: No cyanosis. No clubbing,      Skin turgor normal, Capillary refill normal, Radial dial pulse 2+  Skin:     Not pale. Not Jaundiced  No rashes   Psych:  Good insight. Not depressed. Not anxious or agitated. Neurologic: EOMs intact. No facial asymmetry. No aphasia or slurred speech. Symmetrical strength, Sensation grossly intact.  Alert and oriented X 4.     _______________________________________________________________________  Care Plan discussed with:    Comments   Patient x    Family  x     RN     Care Manager                    Consultant:      _______________________________________________________________________  Expected  Disposition:   Home with Family x   HH/PT/OT/RN    SNF/LTC    ANÍBAL    ________________________________________________________________________  TOTAL TIME:  61 Minutes    Critical Care Provided     Minutes non procedure based      Comments    x Reviewed previous records   >50% of visit spent in counseling and coordination of care  Discussion with patient and/or family and questions answered ________________________________________________________________________  Signed: Alan Ontiveros MD    Procedures: see electronic medical records for all procedures/Xrays and details which were not copied into this note but were reviewed prior to creation of Plan. LAB DATA REVIEWED:    Recent Results (from the past 24 hour(s))   EKG, 12 LEAD, INITIAL    Collection Time: 04/23/17 10:43 AM   Result Value Ref Range    Ventricular Rate 201 BPM    Atrial Rate 208 BPM    QRS Duration 66 ms    Q-T Interval 214 ms    QTC Calculation (Bezet) 391 ms    Calculated R Axis -9 degrees    Calculated T Axis 50 degrees    Diagnosis       Undetermined rhythm , supraventricular tachycardia  Nonspecific ST abnormality  Abnormal ECG  No previous ECGs available  Confirmed by Rosibel Rees MD, --- ([id number]) on 4/23/2017 8:08:58 PM     EKG, 12 LEAD, INITIAL    Collection Time: 04/23/17 10:48 AM   Result Value Ref Range    Ventricular Rate 96 BPM    Atrial Rate 96 BPM    P-R Interval 124 ms    QRS Duration 78 ms    Q-T Interval 360 ms    QTC Calculation (Bezet) 454 ms    Calculated P Axis 56 degrees    Calculated R Axis 1 degrees    Calculated T Axis 30 degrees    Diagnosis       Normal sinus rhythm  Nonspecific ST abnormality  Abnormal ECG  When compared with ECG of 23-APR-2017 10:43,  conversion from SVT to sinus rhythm  Confirmed by Rosibel Rees MD, --- ([id number]) on 4/23/2017 8:09:30 PM     CBC WITH AUTOMATED DIFF    Collection Time: 04/23/17 11:00 AM   Result Value Ref Range    WBC 7.1 3.6 - 11.0 K/uL    RBC 5.04 3.80 - 5.20 M/uL    HGB 14.0 11.5 - 16.0 g/dL    HCT 43.2 35.0 - 47.0 %    MCV 85.7 80.0 - 99.0 FL    MCH 27.8 26.0 - 34.0 PG    MCHC 32.4 30.0 - 36.5 g/dL    RDW 13.4 11.5 - 14.5 %    PLATELET 038 182 - 449 K/uL    NEUTROPHILS 59 32 - 75 %    LYMPHOCYTES 29 12 - 49 %    MONOCYTES 10 5 - 13 %    EOSINOPHILS 1 0 - 7 %    BASOPHILS 1 0 - 1 %    ABS. NEUTROPHILS 4.2 1.8 - 8.0 K/UL    ABS.  LYMPHOCYTES 2.0 0.8 - 3.5 K/UL    ABS. MONOCYTES 0.7 0.0 - 1.0 K/UL    ABS. EOSINOPHILS 0.1 0.0 - 0.4 K/UL    ABS. BASOPHILS 0.0 0.0 - 0.1 K/UL   METABOLIC PANEL, COMPREHENSIVE    Collection Time: 04/23/17 11:00 AM   Result Value Ref Range    Sodium 143 136 - 145 mmol/L    Potassium 4.1 3.5 - 5.1 mmol/L    Chloride 104 97 - 108 mmol/L    CO2 25 21 - 32 mmol/L    Anion gap 14 5 - 15 mmol/L    Glucose 135 (H) 65 - 100 mg/dL    BUN 11 7.0 - 18.0 MG/DL    Creatinine 1.31 (H) 0.55 - 1.02 MG/DL    BUN/Creatinine ratio 8 (L) 12 - 20      GFR est AA 51 (L) >60 ml/min/1.73m2    GFR est non-AA 42 (L) >60 ml/min/1.73m2    Calcium 8.8 8.5 - 10.1 MG/DL    Bilirubin, total 0.5 0.2 - 1.0 MG/DL    ALT (SGPT) 25 14 - 63 U/L    AST (SGOT) 18 15 - 37 U/L    Alk.  phosphatase 69 45 - 117 U/L    Protein, total 7.2 6.4 - 8.2 g/dL    Albumin 3.8 3.5 - 5.0 g/dL    Globulin 3.4 2.0 - 4.0 g/dL    A-G Ratio 1.1 1.1 - 2.2     LACTIC ACID, PLASMA    Collection Time: 04/23/17 11:00 AM   Result Value Ref Range    Lactic acid 3.2 (HH) 0.4 - 2.0 MMOL/L   CK W/ CKMB & INDEX    Collection Time: 04/23/17 11:00 AM   Result Value Ref Range    CK 59 26 - 192 U/L    CK - MB <1.0 <3.6 NG/ML    CK-MB Index Cannot be calulated 0 - 2.5     TROPONIN I    Collection Time: 04/23/17 11:00 AM   Result Value Ref Range    Troponin-I, Qt. 0.22 (H) <0.08 ng/mL   TROPONIN I    Collection Time: 04/23/17  2:00 PM   Result Value Ref Range    Troponin-I, Qt. 0.71 (H) <0.08 ng/mL   LACTIC ACID, PLASMA    Collection Time: 04/23/17  2:00 PM   Result Value Ref Range    Lactic acid 1.5 0.4 - 2.0 MMOL/L

## 2017-04-24 NOTE — CARDIO/PULMONARY
C/P Rehab Note:    Chart Reviewed. Pt transferred from [facility] with chest pain and elevated troponin. Pt is a non smoker. Echo yesterday, results pending. PMH significant for:  - SVT prior ILR  - CATH 2013 (without evidence of CAD),EF 65%  - CVA  Cardiology consulted, will continue to follow.

## 2017-04-25 ENCOUNTER — ANESTHESIA EVENT (OUTPATIENT)
Dept: NON INVASIVE DIAGNOSTICS | Age: 52
DRG: 274 | End: 2017-04-25
Payer: COMMERCIAL

## 2017-04-25 ENCOUNTER — ANESTHESIA (OUTPATIENT)
Dept: NON INVASIVE DIAGNOSTICS | Age: 52
DRG: 274 | End: 2017-04-25
Payer: COMMERCIAL

## 2017-04-25 LAB
ANION GAP BLD CALC-SCNC: 7 MMOL/L (ref 5–15)
BASOPHILS # BLD AUTO: 0 K/UL (ref 0–0.1)
BASOPHILS # BLD: 1 % (ref 0–1)
BUN SERPL-MCNC: 8 MG/DL (ref 6–20)
BUN/CREAT SERPL: 9 (ref 12–20)
CALCIUM SERPL-MCNC: 8.3 MG/DL (ref 8.5–10.1)
CHLORIDE SERPL-SCNC: 111 MMOL/L (ref 97–108)
CO2 SERPL-SCNC: 27 MMOL/L (ref 21–32)
CREAT SERPL-MCNC: 0.89 MG/DL (ref 0.55–1.02)
EOSINOPHIL # BLD: 0.1 K/UL (ref 0–0.4)
EOSINOPHIL NFR BLD: 1 % (ref 0–7)
ERYTHROCYTE [DISTWIDTH] IN BLOOD BY AUTOMATED COUNT: 13.4 % (ref 11.5–14.5)
GLUCOSE SERPL-MCNC: 107 MG/DL (ref 65–100)
HCT VFR BLD AUTO: 39.3 % (ref 35–47)
HGB BLD-MCNC: 12.3 G/DL (ref 11.5–16)
LYMPHOCYTES # BLD AUTO: 44 % (ref 12–49)
LYMPHOCYTES # BLD: 2.5 K/UL (ref 0.8–3.5)
MCH RBC QN AUTO: 27.2 PG (ref 26–34)
MCHC RBC AUTO-ENTMCNC: 31.3 G/DL (ref 30–36.5)
MCV RBC AUTO: 86.9 FL (ref 80–99)
MONOCYTES # BLD: 0.5 K/UL (ref 0–1)
MONOCYTES NFR BLD AUTO: 9 % (ref 5–13)
NEUTS SEG # BLD: 2.6 K/UL (ref 1.8–8)
NEUTS SEG NFR BLD AUTO: 45 % (ref 32–75)
PLATELET # BLD AUTO: 159 K/UL (ref 150–400)
POTASSIUM SERPL-SCNC: 3.4 MMOL/L (ref 3.5–5.1)
RBC # BLD AUTO: 4.52 M/UL (ref 3.8–5.2)
SODIUM SERPL-SCNC: 145 MMOL/L (ref 136–145)
WBC # BLD AUTO: 5.7 K/UL (ref 3.6–11)

## 2017-04-25 PROCEDURE — C1894 INTRO/SHEATH, NON-LASER: HCPCS

## 2017-04-25 PROCEDURE — 74011000250 HC RX REV CODE- 250

## 2017-04-25 PROCEDURE — C1733 CATH, EP, OTHR THAN COOL-TIP: HCPCS

## 2017-04-25 PROCEDURE — 74011250637 HC RX REV CODE- 250/637: Performed by: INTERNAL MEDICINE

## 2017-04-25 PROCEDURE — 77030028698 HC BLD TISS PLSM MEDT -D

## 2017-04-25 PROCEDURE — 77030011640 HC PAD GRND REM COVD -A

## 2017-04-25 PROCEDURE — 77030010880 HC CBL EP SUPRME STJU -C

## 2017-04-25 PROCEDURE — 77030010507 HC ADH SKN DERMBND J&J -B

## 2017-04-25 PROCEDURE — 77030030806 HC PTCH ENSIT NAVX STJU -G

## 2017-04-25 PROCEDURE — 36415 COLL VENOUS BLD VENIPUNCTURE: CPT | Performed by: HOSPITALIST

## 2017-04-25 PROCEDURE — 77030004964 HC CBL EP ABLAT BSC -C

## 2017-04-25 PROCEDURE — 77030031139 HC SUT VCRL2 J&J -A

## 2017-04-25 PROCEDURE — 93306 TTE W/DOPPLER COMPLETE: CPT

## 2017-04-25 PROCEDURE — C1730 CATH, EP, 19 OR FEW ELECT: HCPCS

## 2017-04-25 PROCEDURE — 02583ZZ DESTRUCTION OF CONDUCTION MECHANISM, PERCUTANEOUS APPROACH: ICD-10-PCS | Performed by: INTERNAL MEDICINE

## 2017-04-25 PROCEDURE — 65270000029 HC RM PRIVATE

## 2017-04-25 PROCEDURE — 77030018836 HC SOL IRR NACL ICUM -A

## 2017-04-25 PROCEDURE — 77030016894 HC CBL EP DX CATH3 STJU -B

## 2017-04-25 PROCEDURE — 77030018729 HC ELECTRD DEFIB PAD CARD -B

## 2017-04-25 PROCEDURE — 80048 BASIC METABOLIC PNL TOTAL CA: CPT | Performed by: HOSPITALIST

## 2017-04-25 PROCEDURE — 74011250636 HC RX REV CODE- 250/636

## 2017-04-25 PROCEDURE — C1731 CATH, EP, 20 OR MORE ELEC: HCPCS

## 2017-04-25 PROCEDURE — 74011250636 HC RX REV CODE- 250/636: Performed by: NURSE PRACTITIONER

## 2017-04-25 PROCEDURE — 4A0234Z MEASUREMENT OF CARDIAC ELECTRICAL ACTIVITY, PERCUTANEOUS APPROACH: ICD-10-PCS | Performed by: INTERNAL MEDICINE

## 2017-04-25 PROCEDURE — 93613 INTRACARDIAC EPHYS 3D MAPG: CPT

## 2017-04-25 PROCEDURE — 77030015398 HC CBL EP EXT STJU -C

## 2017-04-25 PROCEDURE — 77010033678 HC OXYGEN DAILY

## 2017-04-25 PROCEDURE — 02K83ZZ MAP CONDUCTION MECHANISM, PERCUTANEOUS APPROACH: ICD-10-PCS | Performed by: INTERNAL MEDICINE

## 2017-04-25 PROCEDURE — 85025 COMPLETE CBC W/AUTO DIFF WBC: CPT | Performed by: HOSPITALIST

## 2017-04-25 RX ORDER — DOBUTAMINE HYDROCHLORIDE 200 MG/100ML
INJECTION INTRAVENOUS
Status: DISCONTINUED
Start: 2017-04-25 | End: 2017-04-25

## 2017-04-25 RX ORDER — LIDOCAINE HYDROCHLORIDE 10 MG/ML
1-40 INJECTION INFILTRATION; PERINEURAL
Status: DISCONTINUED | OUTPATIENT
Start: 2017-04-25 | End: 2017-04-25

## 2017-04-25 RX ORDER — MIDAZOLAM HYDROCHLORIDE 1 MG/ML
INJECTION, SOLUTION INTRAMUSCULAR; INTRAVENOUS
Status: DISCONTINUED
Start: 2017-04-25 | End: 2017-04-25

## 2017-04-25 RX ORDER — HEPARIN SODIUM 200 [USP'U]/100ML
INJECTION, SOLUTION INTRAVENOUS
Status: COMPLETED
Start: 2017-04-25 | End: 2017-04-25

## 2017-04-25 RX ORDER — SODIUM CHLORIDE 0.9 % (FLUSH) 0.9 %
5-10 SYRINGE (ML) INJECTION EVERY 8 HOURS
Status: DISCONTINUED | OUTPATIENT
Start: 2017-04-25 | End: 2017-04-26 | Stop reason: HOSPADM

## 2017-04-25 RX ORDER — HEPARIN SODIUM 200 [USP'U]/100ML
1000 INJECTION, SOLUTION INTRAVENOUS ONCE
Status: COMPLETED | OUTPATIENT
Start: 2017-04-25 | End: 2017-04-25

## 2017-04-25 RX ORDER — FENTANYL CITRATE 50 UG/ML
INJECTION, SOLUTION INTRAMUSCULAR; INTRAVENOUS AS NEEDED
Status: DISCONTINUED | OUTPATIENT
Start: 2017-04-25 | End: 2017-04-25 | Stop reason: HOSPADM

## 2017-04-25 RX ORDER — FENTANYL CITRATE 50 UG/ML
INJECTION, SOLUTION INTRAMUSCULAR; INTRAVENOUS
Status: DISCONTINUED
Start: 2017-04-25 | End: 2017-04-25

## 2017-04-25 RX ORDER — MIDAZOLAM HYDROCHLORIDE 1 MG/ML
.5-2 INJECTION, SOLUTION INTRAMUSCULAR; INTRAVENOUS
Status: DISCONTINUED | OUTPATIENT
Start: 2017-04-25 | End: 2017-04-25 | Stop reason: HOSPADM

## 2017-04-25 RX ORDER — FENTANYL CITRATE 50 UG/ML
25-50 INJECTION, SOLUTION INTRAMUSCULAR; INTRAVENOUS
Status: DISCONTINUED | OUTPATIENT
Start: 2017-04-25 | End: 2017-04-25 | Stop reason: HOSPADM

## 2017-04-25 RX ORDER — POTASSIUM CHLORIDE 750 MG/1
10 TABLET, FILM COATED, EXTENDED RELEASE ORAL
Status: COMPLETED | OUTPATIENT
Start: 2017-04-25 | End: 2017-04-25

## 2017-04-25 RX ORDER — PROPOFOL 10 MG/ML
INJECTION, EMULSION INTRAVENOUS
Status: DISCONTINUED | OUTPATIENT
Start: 2017-04-25 | End: 2017-04-25 | Stop reason: HOSPADM

## 2017-04-25 RX ORDER — SODIUM CHLORIDE 0.9 % (FLUSH) 0.9 %
5-10 SYRINGE (ML) INJECTION AS NEEDED
Status: DISCONTINUED | OUTPATIENT
Start: 2017-04-25 | End: 2017-04-26 | Stop reason: HOSPADM

## 2017-04-25 RX ORDER — HYDRALAZINE HYDROCHLORIDE 20 MG/ML
10 INJECTION INTRAMUSCULAR; INTRAVENOUS
Status: DISCONTINUED | OUTPATIENT
Start: 2017-04-25 | End: 2017-04-26 | Stop reason: HOSPADM

## 2017-04-25 RX ORDER — MIDAZOLAM HYDROCHLORIDE 1 MG/ML
INJECTION, SOLUTION INTRAMUSCULAR; INTRAVENOUS AS NEEDED
Status: DISCONTINUED | OUTPATIENT
Start: 2017-04-25 | End: 2017-04-25 | Stop reason: HOSPADM

## 2017-04-25 RX ORDER — LIDOCAINE HYDROCHLORIDE 10 MG/ML
INJECTION INFILTRATION; PERINEURAL
Status: COMPLETED
Start: 2017-04-25 | End: 2017-04-25

## 2017-04-25 RX ORDER — OXYCODONE AND ACETAMINOPHEN 5; 325 MG/1; MG/1
1 TABLET ORAL
Status: DISCONTINUED | OUTPATIENT
Start: 2017-04-25 | End: 2017-04-26 | Stop reason: HOSPADM

## 2017-04-25 RX ORDER — DOBUTAMINE HYDROCHLORIDE 200 MG/100ML
2.5-1 INJECTION INTRAVENOUS
Status: DISCONTINUED | OUTPATIENT
Start: 2017-04-25 | End: 2017-04-25

## 2017-04-25 RX ORDER — POTASSIUM CHLORIDE 7.45 MG/ML
10 INJECTION INTRAVENOUS
Status: DISCONTINUED | OUTPATIENT
Start: 2017-04-25 | End: 2017-04-25

## 2017-04-25 RX ADMIN — MIDAZOLAM HYDROCHLORIDE 2 MG: 1 INJECTION, SOLUTION INTRAMUSCULAR; INTRAVENOUS at 13:49

## 2017-04-25 RX ADMIN — LIDOCAINE HYDROCHLORIDE 10 ML: 10 INJECTION INFILTRATION; PERINEURAL at 14:02

## 2017-04-25 RX ADMIN — LIDOCAINE HYDROCHLORIDE 10 ML: 10 INJECTION, SOLUTION INFILTRATION; PERINEURAL at 14:02

## 2017-04-25 RX ADMIN — POTASSIUM CHLORIDE 10 MEQ: 750 TABLET, FILM COATED, EXTENDED RELEASE ORAL at 21:13

## 2017-04-25 RX ADMIN — PROPOFOL 100 MCG/KG/MIN: 10 INJECTION, EMULSION INTRAVENOUS at 13:36

## 2017-04-25 RX ADMIN — Medication 10 ML: at 06:44

## 2017-04-25 RX ADMIN — Medication 10 ML: at 21:13

## 2017-04-25 RX ADMIN — FENTANYL CITRATE 50 MCG: 50 INJECTION, SOLUTION INTRAMUSCULAR; INTRAVENOUS at 14:01

## 2017-04-25 RX ADMIN — ACETAMINOPHEN 650 MG: 325 TABLET, FILM COATED ORAL at 22:54

## 2017-04-25 RX ADMIN — POTASSIUM CHLORIDE 10 MEQ: 10 INJECTION, SOLUTION INTRAVENOUS at 12:33

## 2017-04-25 RX ADMIN — HEPARIN SODIUM 1000 UNITS: 200 INJECTION, SOLUTION INTRAVENOUS at 13:54

## 2017-04-25 RX ADMIN — FENTANYL CITRATE 50 MCG: 50 INJECTION, SOLUTION INTRAMUSCULAR; INTRAVENOUS at 13:49

## 2017-04-25 RX ADMIN — Medication 10 ML: at 17:01

## 2017-04-25 NOTE — CARDIO/PULMONARY
Chart Reviewed. Pt transferred from [facility] with chest pain and elevated troponin. Pt is a non smoker. Echo yesterday, results pending.      PMH significant for:  - SVT prior ILR  - CATH 2013 (without evidence of CAD), EF 55%  - CVA  Pt with NSTEMI. Pending ablation for SVT, symtomatic today. Pt visited,  at the bedside. Pt given printed teaching materials on MI and the cardiac diet. Instruction given on symptom identification of MI and the importance of prompt treatment. Pt denies having an MI. Informed her troponin was released in the blood stream indicating the heart was under stress, related to SVT. Discussed checking weight every am and calling MD if weight is up 2-3 lbs in a day or 5 lbs in a week (or as directed by the physician). Also discussed the cardiac diet (low NA/fat/chol). Reviewed progressive return to activity as tolerated with frequent rest periods as needed, taking medications as prescribed. Discussed balation proecure and what is to be expected. Also discussed activity restrictions post procedure. Will follow post ablation.

## 2017-04-25 NOTE — ROUTINE PROCESS
Patient to go for ECHO. Received report from [address].  Patient comfortable

## 2017-04-25 NOTE — PROGRESS NOTES
Bedside and Verbal shift change report given to Herminia Fu (oncoming nurse) by Juan J Mesa RN (offgoing nurse). Report included the following information SBAR, Procedure Summary, Intake/Output, MAR and Cardiac Rhythm NSR.

## 2017-04-25 NOTE — PROGRESS NOTES
Hospitalist Progress Note    NAME: Zena Whitman   :  1965   MRN:  [id number]         Assessment / Plan:    Paroxysmal SVT, symptomatic POA resolved  Elevated troponin peaked to 1.08 and now trending down 0.58 POA  H/o of MVP  Cont tele monitoring  Check LDL, A1C, TSH  ASA and plavix, holding for now  metoprolol bid, on atorvastatin  ECHO LVEF 55 to 60% without regional wall motion changes, normal RV size and function, no AS, trivial MR, no MS  Ablation today, D/C when cleared by cardiology     Elevated lactic 3.2 -> 1.5  Hypovolemia POA  Creatinine 1.31 to 0.80  Lactic acid normalized following 1L NS bolus at OSH  Gentle IVF     Hx of CVA  no deficits  continue plavix at discharge     Code Status: full  Surrogate Decision Maker:   DVT Prophylaxis: lovenox  GI Prophylaxis: not indicated  Baseline: independent         Subjective:     Chief Complaint / Reason for Physician Visit  \"I am waiting to get the ablation done\". No complaints  Discussed with RN events overnight. Review of Systems:  Symptom Y/N Comments  Symptom Y/N Comments   Fever/Chills n   Chest Pain n    Poor Appetite    Edema     Cough n   Abdominal Pain n    Sputum    Joint Pain     SOB/FERNANDES n   Pruritis/Rash     Nausea/vomit n   Tolerating PT/OT y    Diarrhea n   Tolerating Diet NPO    Constipation    Other       Could NOT obtain due to:      Objective:     VITALS:   Last 24hrs VS reviewed since prior progress note.  Most recent are:  Patient Vitals for the past 24 hrs:   Temp Pulse Resp BP SpO2   17 1115 98.3 °F (36.8 °C) 82 18 (!) 150/96 99 %   17 0830 98.5 °F (36.9 °C) 93 18 158/88 94 %   17 0207 97.5 °F (36.4 °C) 76 18 145/77 98 %   17 2306 98.3 °F (36.8 °C) 80 18 146/84 99 %   17 1912 98.1 °F (36.7 °C) 77 18 134/75 100 %   17 1535 98.3 °F (36.8 °C) 72 18 134/62 100 %       Intake/Output Summary (Last 24 hours) at 17 1245  Last data filed at 17 1231   Gross per 24 hour   Intake 2388.75 ml   Output                0 ml   Net          2388.75 ml        PHYSICAL EXAM:  General: WD, WN. Alert, cooperative, no acute distress    EENT:  EOMI. Anicteric sclerae. MMM  Resp:  CTA bilaterally, no wheezing or rales. No accessory muscle use  CV:  Regular  rhythm,  No edema  GI:  Soft, Non distended, Non tender.  +Bowel sounds  Neurologic:  Alert and oriented X 3, normal speech,   Psych:   Good insight. Not anxious nor agitated  Skin:  No rashes. No jaundice    Reviewed most current lab test results and cultures  YES  Reviewed most current radiology test results   YES  Review and summation of old records today    NO  Reviewed patient's current orders and MAR    YES  PMH/SH reviewed - no change compared to H&P  ________________________________________________________________________  Care Plan discussed with:    Comments   Patient x    Family  x    RN     Care Manager     Consultant                        Multidiciplinary team rounds were held today with , nursing, pharmacist and clinical coordinator. Patient's plan of care was discussed; medications were reviewed and discharge planning was addressed. ________________________________________________________________________  Total NON critical care TIME: 15   Minutes    Total CRITICAL CARE TIME Spent:   Minutes non procedure based      Comments   >50% of visit spent in counseling and coordination of care     ________________________________________________________________________  Michelle Baer MD     Procedures: see electronic medical records for all procedures/Xrays and details which were not copied into this note but were reviewed prior to creation of Plan. LABS:  I reviewed today's most current labs and imaging studies.   Pertinent labs include:  Recent Labs      04/25/17   0218  04/24/17   0424  04/23/17   1100   WBC  5.7  5.3  7.1   HGB  12.3  11.8  14.0   HCT  39.3  37.6  43.2   PLT  159  149*  207 Recent Labs      04/25/17   0218  04/24/17   0424  04/23/17   1100   NA  145  146*  143   K  3.4*  3.6  4.1   CL  111*  112*  104   CO2  27  23  25   GLU  107*  89  135*   BUN  8  9  11   CREA  0.89  0.85  1.31*   CA  8.3*  7.7*  8.8   ALB   --    --   3.8   TBILI   --    --   0.5   SGOT   --    --   18   ALT   --    --   25       Signed: Dutch Arora MD

## 2017-04-25 NOTE — ANESTHESIA POSTPROCEDURE EVALUATION
Post-Anesthesia Evaluation and Assessment    Patient: Kana Pennington MRN: [id number]  SSN: [id number]    YOB: 1965  Age: 45 y.o. Sex: female       Cardiovascular Function/Vital Signs  Visit Vitals    BP (!) 147/94    Pulse 79    Temp 36.8 °C (98.2 °F)    Resp 16    Ht 5' 9\" (1.753 m)    Wt 94.7 kg (208 lb 12.4 oz)    SpO2 100%    BMI 30.83 kg/m2       Patient is status post total IV anesthesia, MAC anesthesia for * No procedures listed *. Nausea/Vomiting: None    Postoperative hydration reviewed and adequate. Pain:  Pain Scale 1: Numeric (0 - 10) (04/25/17 1504)  Pain Intensity 1: 0 (04/25/17 1504)   Managed    Neurological Status: At baseline    Mental Status and Level of Consciousness: Arousable    Pulmonary Status:   O2 Device: Nasal cannula (04/25/17 1504)   Adequate oxygenation and airway patent    Complications related to anesthesia: None    Post-anesthesia assessment completed.  No concerns    Signed By: Júnior Song MD     April 25, 2017

## 2017-04-25 NOTE — PROCEDURES
[address]  [phone]    Patient ID:  Patient: Astrid Mondragon  MRN: [id number]  Age: 45 y.o.  : 1965  Gender: female  Study Date: 2017    History:  This is a female with paroxysmal SVT with symptoms, here for elective EP study and cardiac ablation. She has had a regular narrow complex tachycardia of just over 200 bpm documented on ECG. Procedures Performed:   1. Comprehensive EP study with attempted induction of arrhythmia (00846)   2. Left atrial pacing and recording from coronary sinus and left atrium (02365-18)   3. Intracardiac electrophysiologic 3-D mapping (89962)    The patient was brought to EP lab in NPO state and after informed consent.  Continuous ECG and hemodynamic monitoring was performed.  Sedation was by the anesthesiologist who was in constant attendance and supervision throughout the procedure.  The right groin was anesthetized with 1% lidocaine and access obtained (3 safe sheaths in the right femoral vein). An 8Fr sheath on the right was changed to SR-0 long support sheath later in the case.  A deflectable duodecapolar catheter was advanced into the coronary sinus and left atrial pacing and recordings were obtained. Quadripolar catheters were positioned in the low septal RA and RV apex as needed.  An 8 Fr 5 mm tip deflectable mapping and ablation catheter was used later in the case. A comprehensive EP study performed including both atrial and ventricular burst and extrastimulus pacing was performed. 3D mapping was using the Subtext 61 system. At the end of the case, the sheaths were removed and hemostasis obtained. Preoperative Diagnosis: As above. Postoperative Diagnosis: As above. Procedure:  As above. Surgeon(s) and Role:  Ana Berrios MD - Primary   Anesthesia:   MAC by the anesthesiologist.  Estimated Blood Loss:  <5 cc.   Specimens: * No specimens in log *   Findings:  As below. Complications:  None. Ablation therapy:  5 treatments were given to total of 1.98 minutes. Fluoroscopy time:  4.9 minutes  Case time:  45 minutes    Findings:  1.  At baseline, sinus rhythm with normal intervals was present.  (, , QRS 76, and  ms).   The AH and HV intervals were 65 and 44 ms, respectively. 2.  Grossly, normal sinus node function.  No pauses. 3.  Antegrade conduction was midline and decremental without preexcitation.  WBCL 290 ms. Dual AVN physiology was noted during rapid atrial pacing as evidenced by induction also of what appeared to be AV node reentry. The fast and slow pathway could not be distinguished due to brisk fast pathway function. AVNERP pacing at 330 ms was 250 ms, and the AERP was 500/220 ms. 4.  Retrograde conduction was midline and decremental.  Dual physiology was not noted. The retrograde WBCL was 250 ms. The VAERP was less than or equal to 400/220 ms and the VERP was 400/220 ms.  5.  Tachycardia was inducible during rapid atrial pacing around 300 ms from the right atrium. A regular, narrow complex tachycardia (cycle length 320 ms) with very short RP and 1:1 AV relationship was induced. 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 Initiation was with significant NJ prolongation immediately with very short VA relationship. The tachycardia was nonsustained, so was not around long enough to entrain. This finding correlated with the historical SVT, so a slow pathway modification was performed. 6.  Using 3D mapping to facilitate precision of ablation therapy, an 8Fr 5mm tip mapping and ablation catheter was used to ablate in the slow pathway region. During therapy, JET was seen. Tachycardia was not inducible after therapy. Fast pathway function was not significantly affected.   The WBCL after ablation was around 320 ms.  7.  Sheaths were removed at the end of the case and hemostasis was easily obtained. Impression:  1.  Successful modification of slow pathway for ablation of common AVNRT. After therapy, SVT was not inducible. 2.  Grossly normal sinus node function. 3.  Normal antegrade conduction without preexcitation or infra-His block. Dual AVN physiology was identified. 4.  Retrograde conduction was normal.    RECS:  After successful SVT ablation for AVNRT, follow-up with in 4-6 weeks in clinic.

## 2017-04-25 NOTE — PROGRESS NOTES
Cardiopulmonary Care Interdisciplinary rounds were held today to discuss patient plan of care and outcomes. The following members were present: PT, NP/Physician, Pharmacy, Nursing, Nutritionist and Case Management.       Plan of Care: Continue current treatment plan; Replete potassium; Ablation scheduled for this afternoon

## 2017-04-25 NOTE — PROGRESS NOTES
TRANSFER - OUT REPORT:    Verbal report given to Judah (name) on [place]  being transferred to [facility](unit) for routine progression of care       Report consisted of patients Situation, Background, Assessment and   Recommendations(SBAR). Information from the following report(s) SBAR was reviewed with the receiving nurse. Lines:   Saline Lock 04/25/17 Right Wrist (Active)   Site Assessment Clean, dry, & intact 4/25/2017 12:33 PM   Phlebitis Assessment 0 4/25/2017 12:33 PM   Infiltration Assessment 0 4/25/2017 12:33 PM   Hub Color/Line Status Blue 4/25/2017 12:33 PM        Opportunity for questions and clarification was provided.       Patient transported with:   Monitor  Patient to go to the [facility] after the EPS procedure

## 2017-04-25 NOTE — ANESTHESIA PREPROCEDURE EVALUATION
Anesthetic History   No history of anesthetic complications            Review of Systems / Medical History  Patient summary reviewed, nursing notes reviewed and pertinent labs reviewed    Pulmonary  Within defined limits                 Neuro/Psych       CVA  TIA     Cardiovascular            Dysrhythmias : SVT  Past MI and CAD    Exercise tolerance: >4 METS  Comments: Ejection fraction was  estimated in the range of 55 % to 60 %.     MVP   GI/Hepatic/Renal  Within defined limits              Endo/Other  Within defined limits           Other Findings            Physical Exam    Airway  Mallampati: II  TM Distance: 4 - 6 cm  Neck ROM: normal range of motion   Mouth opening: Normal     Cardiovascular  Regular rate and rhythm,  S1 and S2 normal,  no murmur, click, rub, or gallop             Dental  No notable dental hx       Pulmonary  Breath sounds clear to auscultation               Abdominal  GI exam deferred       Other Findings            Anesthetic Plan    ASA: 2  Anesthesia type: total IV anesthesia and MAC          Induction: Intravenous  Anesthetic plan and risks discussed with: Patient

## 2017-04-25 NOTE — PROGRESS NOTES
S/p EP study revealing nonsustained AV node reentrant tachycardia (AVNRT). Slow pathway modification performed with RFA. No inducible SVT afterward. No immediate complications.

## 2017-04-25 NOTE — PROGRESS NOTES
[address] SHIFT NURSING NOTE    Bedside shift change report given to [place] RN (oncoming nurse) by Jorge Kendrick RN (offgoing nurse). Report included the following information SBAR, ED Summary, Procedure Summary, Recent Results, Med Rec Status and Cardiac Rhythm NSR.    SHIFT SUMMARY: Pt comfortable throughout shift - NPO after midnight for scheduled Ablation. Consent for Blood products/Transfusion of Blood and Cardiac operation/procedure completed and placed in patient chart. Admission Date 4/23/2017   Admission Diagnosis Elevated Troponin  NSTEMI (non-ST elevated myocardial infarction) ([facility] [address]   Consults IP CONSULT TO CARDIOLOGY        Consults   [] PT   [] OT   [] Speech   [] Palliative      [] Hospice    [] Case Management   [x] None   Cardiac Monitoring   [x] Yes   [] No     Antibiotics   [] Yes   [x] No   GI Prophylaxis  (Ex: Protonix, Pepcid, etc,.)   [] Yes   [x] No          DVT Prophylaxis   SCDs:             Kolby stockings:         [] Medication (Ex: Lovenox, Eliquis, Brilinta, Coumadin,  Heparin, etc..)   [] Contraindicated   [] No VTE needed       Urinary Catheter             LDAs               Peripheral IV 04/23/17 Left Antecubital (Active)   Site Assessment Clean, dry, & intact 4/25/2017  2:21 AM   Phlebitis Assessment 0 4/25/2017  2:21 AM   Infiltration Assessment 0 4/25/2017  2:21 AM   Dressing Status Clean, dry, & intact 4/25/2017  2:21 AM   Dressing Type Transparent 4/25/2017  2:21 AM   Hub Color/Line Status Green; Infusing 4/25/2017  2:21 AM                      I/Os   Intake/Output Summary (Last 24 hours) at 04/25/17 0248  Last data filed at 04/24/17 2019   Gross per 24 hour   Intake             1110 ml   Output                0 ml   Net             1110 ml         Activity Level Activity Level: Up ad alisia     Activity Assistance: No assistance needed   Diet Active Orders   Diet    DIET NPO      Purposeful Rounding every 1-2 hour?    [x] Yes    Celine Score  Total Score: 1   Bed Alarm (If score 3 or >)   [] Yes    [] Refused (See signed refusal form in chart)   Everton Score  Everton Score: 22       Everton Score (if score 14 or less)   [] PMT consult   [] Nutrition consult   [] Wound Care consult      []  Specialty bed         Influenza Vaccine Received Flu Vaccine for Current Season (usually Sept-March): Not Flu Season               Needs prior to discharge:   Home O2 required:    [] Yes   [] No     If yes, how much O2 required?     Other:    Last Bowel Movement Date: 04/24/17   Readmission Risk Assessment Tool Score Low Risk            8       Total Score        2 Patient Living Status    4 More than 1 Admission in calendar year    2 Charlson Comorbidity Score        Criteria that do not apply:    Relationship with PCP    Patient Length of Stay > 5    Patient Insurance is Medicare, Medicaid or Self Pay       Expected Length of Stay 2d 19h   Actual Length of Stay 2

## 2017-04-25 NOTE — PROGRESS NOTES
Spiritual Care Partner Volunteer visited patient in [facility] on 4/25/17. Documented by:    Dania Awad M.S.   Spiritual Care Department  If need arise please call GISEL [phone])

## 2017-04-25 NOTE — PROGRESS NOTES
0730    [facility] SBAR, Kardex, ED Summary, Procedure Summary, Intake/Output, MAR, Accordion, Recent Results, Med Rec Status and Cardiac Rhythm NSR to ST were discussed. Cass Mena        [address] SHIFT [address]URSING NOTE    Bedside shift change report given to [address] (oncoming nurse) by Thelma Cheney (offgoing nurse). Report included the following information SBAR, Kardex, Procedure Summary, Intake/Output, MAR, Accordion, Recent Results, Med Rec Status and Cardiac Rhythm NSR.    SHIFT SUMMARY: Patient is going for an ablation tomorrow afternoon. Admission Date 4/23/2017   Admission Diagnosis Elevated Troponin  NSTEMI (non-ST elevated myocardial infarction) ([facility] [address]   Consults IP CONSULT TO CARDIOLOGY        Consults   [] PT   [] OT   [] Speech   [] Palliative      [] Hospice    [] Case Management   [] None   Cardiac Monitoring   [x] Yes   [] No     Antibiotics   [] Yes   [x] No   GI Prophylaxis  (Ex: Protonix, Pepcid, etc,.)   [] Yes   [x] No          DVT Prophylaxis   SCDs:             Kolby stockings:         [] Medication (Ex: Lovenox, Eliquis, Brilinta, Coumadin,  Heparin, etc..)   [] Contraindicated   [] No VTE needed       Urinary Catheter             LDAs               Peripheral IV 04/23/17 Left Antecubital (Active)   Site Assessment Clean, dry, & intact 4/24/2017  4:00 PM   Phlebitis Assessment 0 4/24/2017  4:00 PM   Infiltration Assessment 0 4/24/2017  4:00 PM   Dressing Status Clean, dry, & intact 4/24/2017  4:00 PM   Dressing Type Tape;Transparent 4/24/2017  4:00 PM   Hub Color/Line Status Green; Infusing 4/24/2017  4:00 PM                      I/Os   Intake/Output Summary (Last 24 hours) at 04/24/17 2014  Last data filed at 04/24/17 0800   Gross per 24 hour   Intake             1100 ml   Output                0 ml   Net             1100 ml         Activity Level Activity Level: Up ad alisia     Activity Assistance: No assistance needed   Diet Active Orders   Diet    DIET CARDIAC Regular    DIET NPO      Purposeful Rounding every 1-2 hour? [x] Yes    Celine Score  Total Score: 1   Bed Alarm (If score 3 or >)   [] Yes    [] Refused (See signed refusal form in chart)   Everton Score  Everton Score: 22       Everton Score (if score 14 or less)   [] PMT consult   [] Nutrition consult   [] Wound Care consult      []  Specialty bed         Influenza Vaccine Received Flu Vaccine for Current Season (usually Sept-March): Not Flu Season               Needs prior to discharge:   Home O2 required:    [] Yes   [x] No     If yes, how much O2 required?     Other:    Last Bowel Movement Date: 04/24/17   Readmission Risk Assessment Tool Score Low Risk            8       Total Score        2 Patient Living Status    4 More than 1 Admission in calendar year    2 Charlson Comorbidity Score        Criteria that do not apply:    Relationship with PCP    Patient Length of Stay > 5    Patient Insurance is Medicare, Medicaid or Self Pay       Expected Length of Stay 2d 19h   Actual Length of Stay 1

## 2017-04-25 NOTE — PROGRESS NOTES
TRANSFER - IN REPORT:    Verbal report received from [address] RN(name) on [facility]  being received from \[facility]\""(unit) for routine progression of care      Report consisted of patients Situation, Background, Assessment and   Recommendations(SBAR). Information from the following report(s) SBAR, Procedure Summary and MAR was reviewed with the receiving nurse. Opportunity for questions and clarification was provided. Assessment completed upon patients arrival to unit and care assumed. TRANSFER - IN REPORT:    Verbal report received from [place] RN(name) on [facility]  being received from [facility](unit) for routine progression of care      Report consisted of patients Situation, Background, Assessment and   Recommendations(SBAR). Information from the following report(s) SBAR and Kardex was reviewed with the receiving nurse. Opportunity for questions and clarification was provided. Assessment completed upon patients arrival to unit and care assumed.

## 2017-04-26 VITALS
HEART RATE: 96 BPM | WEIGHT: 208.78 LBS | OXYGEN SATURATION: 97 % | BODY MASS INDEX: 30.92 KG/M2 | DIASTOLIC BLOOD PRESSURE: 86 MMHG | TEMPERATURE: 98 F | SYSTOLIC BLOOD PRESSURE: 156 MMHG | HEIGHT: 69 IN | RESPIRATION RATE: 16 BRPM

## 2017-04-26 RX ORDER — ATORVASTATIN CALCIUM 40 MG/1
40 TABLET, FILM COATED ORAL
Qty: 30 TAB | Refills: 6 | Status: SHIPPED | OUTPATIENT
Start: 2017-04-26 | End: 2017-04-26

## 2017-04-26 RX ORDER — METOPROLOL SUCCINATE 25 MG/1
25 TABLET, EXTENDED RELEASE ORAL DAILY
Qty: 30 TAB | Refills: 1 | Status: SHIPPED | OUTPATIENT
Start: 2017-04-26 | End: 2019-06-21 | Stop reason: SDUPTHER

## 2017-04-26 RX ADMIN — Medication 10 ML: at 05:54

## 2017-04-26 NOTE — PROGRESS NOTES
S/p SVT ablation yesterday. No hematoma. Ambulatory and taking oral.      Visit Vitals    /86 (BP 1 Location: Left arm, BP Patient Position: At rest)    Pulse 96    Temp 98 °F (36.7 °C)    Resp 16    Ht 5' 9\" (1.753 m)    Wt 94.7 kg (208 lb 12.4 oz)    SpO2 97%    BMI 30.83 kg/m2       ND, NAD.  RRR, no rub. Lungs CTAB. R groin site OK. Awake, appropriate. TELE:  No events. PLAN:  OK from cardiology standpoint to discharge. I want her to take Toprol XL 25 mg daily. Can discharge on her usual cardiac medications otherwise. Can F/U in [place] with her cardiologist in 4-6 weeks (or her cardiologist's discretion). All questions answered for her and her . Patient is aware of signs and sx warranting urgent med F/U or calling 911.

## 2017-04-26 NOTE — DISCHARGE INSTRUCTIONS
POST-EP STUDY AND ABLATION DISCHARGE INSTRUCTIONS:    You had a supraventricular tachycardia (SVT) ablation by Dr. Van Gill on 4/25. This was for a problem called AV node reentry. Do not drive, operate any machinery, or sign any legal documents for 24 hours after your procedure. You must have someone to drive you home. You may take a shower 24 hours after your cardiac procedure. Be sure to get the dressing wet and then remove it; gently wash the area with warm soapy water. Pat dry and leave open to air. To help prevent infections, be sure to keep the cath site clean and dry. No lotions, creams, powders, ointments, etc. in the cath site for approximately 1 week.  Do not take a tub bath, get in a hot tub or swimming pool for approximately 5 days or until the cath site is completely healed.  No strenuous activity or heavy lifting over 20 lbs. for 7 days.  After your procedure, some bruising or discomfort is common during the healing process. Tylenol, 1-2 tablets every 6 hours as needed, is recommended if you experience any discomfort. If you experience any signs or symptoms of infection such as fever, chills, or poorly healing incision, persistent tenderness or swelling in the groin, redness and/or warmth to the touch, numbness, significant tingling or pain at the groin site or affected extremity, rash, drainage from the site, or if the leg feels tight or swollen, call your physician right away.  If bleeding at the site occurs, take a clean gauze pad and apply direct pressure to the groin just above the puncture site, and call your physician right away.  If your procedure involved ablation therapy, you may feel some mild or vague chest discomfort due to delivery of heat therapy to the heart muscle. This should resolve in 1-2 days.   If it gets worse or is associated with shortness of breath, dizziness, loss of consciousness, call your physician right away or call 911 if emergency medical care is needed.

## 2017-04-26 NOTE — CARDIO/PULMONARY
Chart Reviewed.      Pt transferred from [facility] with chest pain and elevated troponin. Pt is a non smoker. Echo with LVEF of 55-60% on 4/25/17.      PMH significant for:  - SVT prior ILR  - CATH 2013 (without evidence of CAD), EF 55%  - CVA  Pt with NSTEMI. Pt denies having an MI. Informed her troponin was released in the blood stream indicating the heart was under stress, related to SVT. Met with patient and her . Printed material given and discussed regarding cardiac ablation and post ablation instructions. Discussed post ablation restrictions (avoiding heavy lifting and keeping the site clean and dry), what to do if bleeding/bruising is noted at the insertion site and when to call the doctor. Also emphasized medication compliance. Encouraged heart healthy diet. Pt and  indicated understanding.

## 2017-04-26 NOTE — DISCHARGE SUMMARY
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 Qty: 30 Tab, Refills: 1         CONTINUE these medications which have NOT CHANGED    Details   Potassium Citrate 15 mEq TbER Take  by mouth. clopidogrel (PLAVIX) 75 mg tablet Take 75 mg by mouth daily. multivitamin (ONE A DAY) tablet Take 1 Tab by mouth daily. omega-3 fatty acids-vitamin e (FISH OIL) 1,000 mg cap Take 1 Cap by mouth two (2) times a day. RED YEAST RICE PO Take  by mouth two (2) times a day. STOP taking these medications       aspirin 81 mg chewable tablet Comments:   Reason for Stopping: Follow-up Information     Follow up With Details Comments Contact Info     Schedule an appointment as soon as possible for a visit  Cardiologist in [place] in 3-4 weeks          Time spent on discharge:   I spent greater than 30 minutes on discharge, seeing and examining the patient, reconciling home meds and new meds, coordinating care with case management, doing the discharge papers and the D/C summary    Discharge disposition: home    Discharge Condition: Stable    Summary of admission H+P(copied from Dr Ajay Kennedy Note):     CHIEF COMPLAINT: palpitations, chest pain     HISTORY OF PRESENT ILLNESS:   Jaydon Montejo is a 45 y.o.  female with reported PMH of CVA without residual deficits, MVP and SVT.  She presents as a transfer from [facility] for elevated troponin. Patient reports symptoms started at around 9:30 this morning while in a meeting. Reports having chest pressure with radiation to bilateral neck and right shoulder with associated lightheadedness, nausea, diaphoresis, and palpitations. Symptoms lasted two hours and resolved following ED arrival at [address] [place] [address]. Reports history of similar episodes for the past 6 years in the past with negative work up. She lives in [place] and says she has seen multiple cardiologists with previoius ILR for a year without events. Not currently taking a BB. Was admitted in 2013 for arrhythmia with elevated troponin and had a negative cath at that time. At [address] [place] [address] initial EKG showed SVT with rate of ~200. Initial troponin was 0.22 with repeat of 0.71. Patient reports her SVT resolved spontaneously while at [address] [place] [address] ED, Currently NSR. She received 1L NS and 25mg PO metoprolol prior to transfer to [facility].     On arrival to [facility] patient reports symptoms have been resolved since this afternoon. Vitals are stable. HR wnl. Denies CP, N/V, SOB, or palpitations. Cardiology consult placed and case discussed with Dr. Brit Kim.     We were asked to admit for work up and evaluation of the above problems.           Past Medical History:   Diagnosis Date    B12 deficiency      Congenital hip dysplasia       Hip replacement    MVP (mitral valve prolapse)      Paroxysmal ventricular tachycardia ([address] 2012    Stroke [address] [address]      Echo TTE results:  LEFT VENTRICLE: Size was normal. Systolic function was normal. Ejection  fraction was estimated in the range of 55 % to 60 %. There were no  regional wall motion abnormalities. Wall thickness was normal.  RIGHT VENTRICLE: The size was normal. Systolic function was normal. Wall  thickness was normal.  LEFT ATRIUM: Size was normal.  RIGHT ATRIUM: Size was normal.  MITRAL VALVE: Normal valve structure. There was normal leaflet separation.   DOPPLER: There was no evidence for stenosis. There was trivial regurgitation. AORTIC VALVE: The valve was probably trileaflet. Leaflets exhibited normal  thickness and normal cuspal separation. DOPPLER: Transaortic velocity was  within the normal range. There was no stenosis. There was trivial regurgitation. TRICUSPID VALVE: Normal valve structure. There was normal leaflet  separation. DOPPLER: There was no evidence for tricuspid stenosis. There  was trivial regurgitation. Insufficient tricuspid regurgitation to  estimate pulmonary artery pressure. PULMONIC VALVE: Not well visualized, but normal Doppler findings. AORTA: The root exhibited normal size. PERICARDIUM: Insignificant pericardial effusion and/or pericardial fat was present. EP Study findings:  1.  At baseline, sinus rhythm with normal intervals was present.  (, , QRS 76, and  ms).   The AH and HV intervals were 65 and 44 ms, respectively. 2.  Grossly, normal sinus node function.  No pauses. 3.  Antegrade conduction was midline and decremental without preexcitation.  WBCL 290 ms. Dual AVN physiology was noted during rapid atrial pacing as evidenced by induction also of what appeared to be AV node reentry. The fast and slow pathway could not be distinguished due to brisk fast pathway function. AVNERP pacing at 330 ms was 250 ms, and the AERP was 500/220 ms. 4.  Retrograde conduction was midline and decremental.  Dual physiology was not noted. The retrograde WBCL was 250 ms. The VAERP was less than or equal to 400/220 ms and the VERP was 400/220 ms.  5.  Tachycardia was inducible during rapid atrial pacing around 300 ms from the right atrium. A regular, narrow complex tachycardia (cycle length 320 ms) with very short RP and 1:1 AV relationship was induced. 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 Initiation was with significant AR prolongation immediately with very short VA relationship.  The tachycardia was nonsustained, so was not around long enough to entrain. This finding correlated with the historical SVT, so a slow pathway modification was performed. 6.  Using 3D mapping to facilitate precision of ablation therapy, an 8Fr 5mm tip mapping and ablation catheter was used to ablate in the slow pathway region. During therapy, JET was seen. Tachycardia was not inducible after therapy. Fast pathway function was not significantly affected. The WBCL after ablation was around 320 ms.  7. Sheaths were removed at the end of the case and hemostasis was easily obtained. Impression:  1.  Successful modification of slow pathway for ablation of common AVNRT. After therapy, SVT was not inducible. 2.  Grossly normal sinus node function. 3.  Normal antegrade conduction without preexcitation or infra-His block. Dual AVN physiology was identified.   4.  Retrograde conduction was normal.  RECS:  After successful SVT ablation for AVNRT, follow-up with in 4-6 weeks in clinic      Hospital course:       Recurrent AV hiro reente, symptomatic POA  Elevated troponin peaked to 1.08 and now trending down 0.58 POA  H/o of MVP  tele monitoring  plavix held  Seen by Dr Tony Shearer from [address] [place] [address] Cardiovascular   Metoprolol Started  Pt does not want to take statins, prior intolerance  ECHO LVEF 55 to 60% without regional wall motion changes, normal RV size and function, no AS, trivial MR, no MS  SVT ablation with Dr Jarett Bonner 6/09/05  No complications post-ablation, cleared for discharge by Dr Jarett Bonner  Will follow up with cardiology on [address] [place], [place] when she returns home  Resume plavix at discharge    Elevated lactic 3.2 -> 1.5  Hypovolemia POA  Creatinine 1.31 to 0.80  Lactic acid normalized following 1L NS bolus at OSH  Gentle IVF     Hx of CVA  no deficits  continue plavix at discharge     Code Status: full  Surrogate Decision Maker:   DVT Prophylaxis: lovenox  GI Prophylaxis: not indicated  Baseline: independent         Subjective:     Chief Complaint / Reason for Physician Visit  Seen post ablation, no complaints, feels ready to go home. Discussed with RN events overnight. Review of Systems:  Symptom Y/N Comments  Symptom Y/N Comments   Fever/Chills n   Chest Pain n    Poor Appetite    Edema     Cough n   Abdominal Pain n    Sputum    Joint Pain     SOB/FERNANDES n   Pruritis/Rash     Nausea/vomit n   Tolerating PT/OT y    Diarrhea n   Tolerating Diet y    Constipation    Other       Could NOT obtain due to:      Objective:     VITALS:   Last 24hrs VS reviewed since prior progress note. Most recent are:  Patient Vitals for the past 24 hrs:   Temp Pulse Resp BP SpO2   04/26/17 0734 98 °F (36.7 °C) 96 16 156/86 97 %   04/26/17 0500 97.6 °F (36.4 °C) 78 16 143/87 100 %   04/25/17 2252 97.9 °F (36.6 °C) 93 16 148/90 99 %   04/25/17 2100 - 100 16 150/86 98 %   04/25/17 2000 - 98 16 129/79 96 %   04/25/17 1900 98.8 °F (37.1 °C) 90 16 134/68 98 %   04/25/17 1800 - 99 - 156/77 99 %   04/25/17 1700 - 80 16 160/84 100 %   04/25/17 1630 - 76 - (!) 159/91 100 %   04/25/17 1600 - 73 - 154/84 100 %   04/25/17 1545 - 76 - 161/83 100 %   04/25/17 1530 - 79 - (!) 152/97 100 %   04/25/17 1515 - 79 16 (!) 147/94 100 %   04/25/17 1504 98.2 °F (36.8 °C) 85 16 151/87 98 %   04/25/17 1457 98 °F (36.7 °C) 80 15 115/72 99 %   04/25/17 1115 98.3 °F (36.8 °C) 82 18 (!) 150/96 99 %       Intake/Output Summary (Last 24 hours) at 04/26/17 0847  Last data filed at 04/26/17 0500   Gross per 24 hour   Intake          2343.75 ml   Output              410 ml   Net          1933.75 ml        PHYSICAL EXAM:  General: WD, WN. Alert, cooperative, no acute distress    Resp:  CTA bilaterally, no wheezing or rales. No accessory muscle use  CV:  Regular  rhythm,  No edema  GI:  Soft, Non distended, Non tender.  +Bowel sounds  Neurologic:  Alert and oriented X 3, normal speech,   Psych:   Good insight. Not anxious nor agitated  Skin:  No rashes.   No jaundice    Reviewed most current lab test results and cultures  YES  Reviewed most current radiology test results   YES  Review and summation of old records today    NO  Reviewed patient's current orders and MAR    YES  PMH/SH reviewed - no change compared to H&P  ________________________________________________________________________  Care Plan discussed with:    Comments   Patient x    Family  x    RN     Care Manager     Consultant  x Dr Magdaleno Christopher team rounds were held today with , nursing, pharmacist and clinical coordinator. Patient's plan of care was discussed; medications were reviewed and discharge planning was addressed. ________________________________________________________________________      Comments   >50% of visit spent in counseling and coordination of care     ________________________________________________________________________  Tara Wiley MD     Procedures: see electronic medical records for all procedures/Xrays and details which were not copied into this note but were reviewed prior to creation of Plan. LABS:  I reviewed today's most current labs and imaging studies.   Pertinent labs include:  Recent Labs      04/25/17 0218 04/24/17 0424 04/23/17   1100   WBC  5.7  5.3  7.1   HGB  12.3  11.8  14.0   HCT  39.3  37.6  43.2   PLT  159  149*  207     Recent Labs      04/25/17 0218 04/24/17 0424 04/23/17   1100   NA  145  146*  143   K  3.4*  3.6  4.1   CL  111*  112*  104   CO2  27  23  25   GLU  107*  89  135*   BUN  8  9  11   CREA  0.89  0.85  1.31*   CA  8.3*  7.7*  8.8   ALB   --    --   3.8   TBILI   --    --   0.5   SGOT   --    --   18   ALT   --    --   25       Signed: Tara Wiley MD

## 2017-04-26 NOTE — PROGRESS NOTES
[facility] April 26, 2017       RE: Emmy Farr      To Whom It May Concern,    This is to certify that Emmy Farr was a patient from 4/23 to 4/26/2017, admitted as an inpatient to the hospital for her serious condition. Under no circumstances was she fit to travel by plane from 4/23-26/2017. Please feel free to contact my office if you have any questions or concerns. Thank you for your assistance in this matter.       Sincerely,      Kizzy Vieira MD  [facility]  [phone]

## 2017-04-26 NOTE — PROGRESS NOTES
1900 - Bedside report from [facility]. NSR w occ PVC's, 70-90's. Moderate bp's. No complaints. Up to void and to chair. R groin benign. +PPP. 2115- ambulation in hallway 300 feet without unusual difficulty or complaints. R groin benign. +PPP.  NSR. BP has been moderate. Pt states she was informed that lipitor would be discontinued . .. Noted as refused dose for this evening. PO K supplement given. 2300 - Tylenol for R groin soreness. No other complaints. VSS.    0700 - Bedside report to RN. NSR w occ. Pvc's. No complaints. R groin benign.

## 2017-04-26 NOTE — PROGRESS NOTES
Dc paperwork reviewed with patient. Aware of fu apts, medications including side effects. Verbalized understanding with no further questions. oob and ambulating with a steady gait. No assist needed. r groin slightly tender.  No bleeding or hematoma

## 2019-03-25 PROBLEM — M25.511 ACUTE PAIN OF RIGHT SHOULDER: Status: ACTIVE | Noted: 2019-03-25

## 2019-03-25 PROBLEM — Z86.73 HISTORY OF CVA (CEREBROVASCULAR ACCIDENT): Status: ACTIVE | Noted: 2019-03-25

## 2019-04-16 PROBLEM — E78.2 MIXED HYPERLIPIDEMIA: Status: ACTIVE | Noted: 2019-04-16

## 2019-12-31 PROBLEM — G89.29 CHRONIC RIGHT SHOULDER PAIN: Status: ACTIVE | Noted: 2019-12-31

## 2019-12-31 PROBLEM — M25.511 CHRONIC RIGHT SHOULDER PAIN: Status: ACTIVE | Noted: 2019-12-31

## 2020-06-25 PROBLEM — K58.0 IRRITABLE BOWEL SYNDROME WITH DIARRHEA: Status: ACTIVE | Noted: 2020-06-25

## 2020-06-25 PROBLEM — M25.511 ACUTE PAIN OF RIGHT SHOULDER: Status: RESOLVED | Noted: 2019-03-25 | Resolved: 2020-06-25

## 2020-06-25 PROBLEM — I21.4 NSTEMI (NON-ST ELEVATED MYOCARDIAL INFARCTION) (HCC): Status: RESOLVED | Noted: 2017-04-23 | Resolved: 2020-06-25

## 2020-06-25 PROBLEM — I25.2 HISTORY OF MYOCARDIAL INFARCTION: Status: ACTIVE | Noted: 2020-06-25

## 2020-06-25 PROBLEM — M79.10 MYALGIA: Status: ACTIVE | Noted: 2020-06-25

## 2021-01-14 PROBLEM — I10 ESSENTIAL HYPERTENSION: Status: ACTIVE | Noted: 2021-01-14

## 2021-03-04 ENCOUNTER — TRANSCRIBE ORDER (OUTPATIENT)
Dept: INTERNAL MEDICINE CLINIC | Age: 56
End: 2021-03-04

## 2022-01-31 ENCOUNTER — TELEPHONE (OUTPATIENT)
Dept: FAMILY MEDICINE CLINIC | Age: 57
End: 2022-01-31

## 2022-01-31 NOTE — TELEPHONE ENCOUNTER
Prior Randa Moser is needed for Ezetimibe 10 MG Tabs. Go to [employer]    Key:   Ibrahima Huntley Name:  Madison Alexander  :  1965

## 2022-03-18 PROBLEM — I25.2 HISTORY OF MYOCARDIAL INFARCTION: Status: ACTIVE | Noted: 2020-06-25

## 2022-03-19 PROBLEM — M79.10 MYALGIA: Status: ACTIVE | Noted: 2020-06-25

## 2022-03-19 PROBLEM — G89.29 CHRONIC RIGHT SHOULDER PAIN: Status: ACTIVE | Noted: 2019-12-31

## 2022-03-19 PROBLEM — E78.2 MIXED HYPERLIPIDEMIA: Status: ACTIVE | Noted: 2019-04-16

## 2022-03-19 PROBLEM — K58.0 IRRITABLE BOWEL SYNDROME WITH DIARRHEA: Status: ACTIVE | Noted: 2020-06-25

## 2022-03-19 PROBLEM — Z86.73 HISTORY OF CVA (CEREBROVASCULAR ACCIDENT): Status: ACTIVE | Noted: 2019-03-25

## 2022-03-19 PROBLEM — M25.511 CHRONIC RIGHT SHOULDER PAIN: Status: ACTIVE | Noted: 2019-12-31

## 2022-03-19 PROBLEM — I10 ESSENTIAL HYPERTENSION: Status: ACTIVE | Noted: 2021-01-14

## 2023-05-22 RX ORDER — METOPROLOL SUCCINATE 25 MG/1
1 TABLET, EXTENDED RELEASE ORAL DAILY
COMMUNITY
Start: 2021-01-20

## 2023-05-22 RX ORDER — EZETIMIBE 10 MG/1
10 TABLET ORAL DAILY
COMMUNITY
Start: 2021-01-14

## 2023-05-22 RX ORDER — QUINAPRIL 40 MG/1
40 TABLET ORAL DAILY
COMMUNITY
Start: 2021-01-14

## 2023-05-22 RX ORDER — CLOPIDOGREL BISULFATE 75 MG/1
1 TABLET ORAL DAILY
COMMUNITY
Start: 2021-02-22

## 2024-03-05 ENCOUNTER — TRANSCRIBE ORDERS (OUTPATIENT)
Facility: HOSPITAL | Age: 59
End: 2024-03-05

## 2024-03-05 DIAGNOSIS — Z12.31 SCREENING MAMMOGRAM FOR BREAST CANCER: Primary | ICD-10-CM

## 2024-03-06 ENCOUNTER — HOSPITAL ENCOUNTER (OUTPATIENT)
Facility: HOSPITAL | Age: 59
Discharge: HOME | End: 2024-03-09
Attending: FAMILY MEDICINE
Payer: COMMERCIAL

## 2024-03-06 VITALS — BODY MASS INDEX: 29.09 KG/M2 | WEIGHT: 197 LBS

## 2024-03-06 DIAGNOSIS — Z12.31 SCREENING MAMMOGRAM FOR BREAST CANCER: ICD-10-CM

## 2024-03-06 PROCEDURE — 77063 BREAST TOMOSYNTHESIS BI: CPT

## 2024-09-21 ENCOUNTER — HOSPITAL ENCOUNTER (EMERGENCY)
Facility: HOSPITAL | Age: 59
Discharge: HOME | End: 2024-09-21
Attending: EMERGENCY MEDICINE
Payer: COMMERCIAL

## 2024-09-21 ENCOUNTER — APPOINTMENT (OUTPATIENT)
Facility: HOSPITAL | Age: 59
End: 2024-09-21
Payer: COMMERCIAL

## 2024-09-21 VITALS
OXYGEN SATURATION: 96 % | SYSTOLIC BLOOD PRESSURE: 128 MMHG | TEMPERATURE: 98.9 F | HEART RATE: 68 BPM | RESPIRATION RATE: 19 BRPM | HEIGHT: 69 IN | DIASTOLIC BLOOD PRESSURE: 90 MMHG | WEIGHT: 200 LBS | BODY MASS INDEX: 29.62 KG/M2

## 2024-09-21 DIAGNOSIS — I48.91 ATRIAL FIBRILLATION WITH RAPID VENTRICULAR RESPONSE (HCC): Primary | ICD-10-CM

## 2024-09-21 LAB
ALBUMIN SERPL-MCNC: 4.1 G/DL (ref 3.5–5)
ALBUMIN/GLOB SERPL: 1.2 (ref 1.1–2.2)
ALP SERPL-CCNC: 88 U/L (ref 45–117)
ALT SERPL-CCNC: 16 U/L (ref 12–78)
ANION GAP SERPL CALC-SCNC: 10 MMOL/L (ref 2–12)
AST SERPL-CCNC: 19 U/L (ref 15–37)
BASOPHILS # BLD: 0 K/UL (ref 0–0.1)
BASOPHILS NFR BLD: 1 % (ref 0–1)
BILIRUB SERPL-MCNC: 0.6 MG/DL (ref 0.2–1)
BUN SERPL-MCNC: 18 MG/DL (ref 6–20)
BUN/CREAT SERPL: 12 (ref 12–20)
CALCIUM SERPL-MCNC: 9.4 MG/DL (ref 8.5–10.1)
CHLORIDE SERPL-SCNC: 100 MMOL/L (ref 97–108)
CO2 SERPL-SCNC: 30 MMOL/L (ref 21–32)
CREAT SERPL-MCNC: 1.48 MG/DL (ref 0.55–1.02)
DIFFERENTIAL METHOD BLD: NORMAL
EKG ATRIAL RATE: 111 BPM
EKG ATRIAL RATE: 67 BPM
EKG DIAGNOSIS: NORMAL
EKG DIAGNOSIS: NORMAL
EKG P AXIS: -10 DEGREES
EKG P-R INTERVAL: 112 MS
EKG Q-T INTERVAL: 316 MS
EKG Q-T INTERVAL: 410 MS
EKG QRS DURATION: 72 MS
EKG QRS DURATION: 84 MS
EKG QTC CALCULATION (BAZETT): 433 MS
EKG QTC CALCULATION (BAZETT): 471 MS
EKG R AXIS: -6 DEGREES
EKG R AXIS: -9 DEGREES
EKG T AXIS: 19 DEGREES
EKG T AXIS: 23 DEGREES
EKG VENTRICULAR RATE: 134 BPM
EKG VENTRICULAR RATE: 67 BPM
EOSINOPHIL # BLD: 0.1 K/UL (ref 0–0.4)
EOSINOPHIL NFR BLD: 1 % (ref 0–7)
ERYTHROCYTE [DISTWIDTH] IN BLOOD BY AUTOMATED COUNT: 13.5 % (ref 11.5–14.5)
GLOBULIN SER CALC-MCNC: 3.5 G/DL (ref 2–4)
GLUCOSE SERPL-MCNC: 112 MG/DL (ref 65–100)
HCT VFR BLD AUTO: 40.4 % (ref 35–47)
HGB BLD-MCNC: 13.2 G/DL (ref 11.5–16)
IMM GRANULOCYTES # BLD AUTO: 0 K/UL (ref 0–0.04)
IMM GRANULOCYTES NFR BLD AUTO: 0 % (ref 0–0.5)
INR PPP: 2.9 (ref 0.9–1.1)
LYMPHOCYTES # BLD: 2.2 K/UL (ref 0.8–3.5)
LYMPHOCYTES NFR BLD: 27 % (ref 12–49)
MAGNESIUM SERPL-MCNC: 1.8 MG/DL (ref 1.6–2.4)
MCH RBC QN AUTO: 27 PG (ref 26–34)
MCHC RBC AUTO-ENTMCNC: 32.7 G/DL (ref 30–36.5)
MCV RBC AUTO: 82.6 FL (ref 80–99)
MONOCYTES # BLD: 0.6 K/UL (ref 0–1)
MONOCYTES NFR BLD: 8 % (ref 5–13)
NEUTS SEG # BLD: 5.3 K/UL (ref 1.8–8)
NEUTS SEG NFR BLD: 63 % (ref 32–75)
NRBC # BLD: 0 K/UL (ref 0–0.01)
NRBC BLD-RTO: 0 PER 100 WBC
PLATELET # BLD AUTO: 262 K/UL (ref 150–400)
PMV BLD AUTO: 11 FL (ref 8.9–12.9)
POTASSIUM SERPL-SCNC: 3.4 MMOL/L (ref 3.5–5.1)
PROT SERPL-MCNC: 7.6 G/DL (ref 6.4–8.2)
PROTHROMBIN TIME: 27.2 SEC (ref 9–11.1)
RBC # BLD AUTO: 4.89 M/UL (ref 3.8–5.2)
SODIUM SERPL-SCNC: 140 MMOL/L (ref 136–145)
TROPONIN I SERPL HS-MCNC: 6 NG/L (ref 0–51)
WBC # BLD AUTO: 8.3 K/UL (ref 3.6–11)

## 2024-09-21 PROCEDURE — 83735 ASSAY OF MAGNESIUM: CPT

## 2024-09-21 PROCEDURE — 84484 ASSAY OF TROPONIN QUANT: CPT

## 2024-09-21 PROCEDURE — 71045 X-RAY EXAM CHEST 1 VIEW: CPT

## 2024-09-21 PROCEDURE — 99285 EMERGENCY DEPT VISIT HI MDM: CPT

## 2024-09-21 PROCEDURE — 96374 THER/PROPH/DIAG INJ IV PUSH: CPT

## 2024-09-21 PROCEDURE — 85610 PROTHROMBIN TIME: CPT

## 2024-09-21 PROCEDURE — 85025 COMPLETE CBC W/AUTO DIFF WBC: CPT

## 2024-09-21 PROCEDURE — 80053 COMPREHEN METABOLIC PANEL: CPT

## 2024-09-21 PROCEDURE — 93005 ELECTROCARDIOGRAM TRACING: CPT | Performed by: EMERGENCY MEDICINE

## 2024-09-21 PROCEDURE — 6360000002 HC RX W HCPCS: Performed by: EMERGENCY MEDICINE

## 2024-09-21 PROCEDURE — 36415 COLL VENOUS BLD VENIPUNCTURE: CPT

## 2024-09-21 RX ORDER — AMIODARONE HYDROCHLORIDE 150 MG/3ML
300 INJECTION, SOLUTION INTRAVENOUS ONCE
Status: COMPLETED | OUTPATIENT
Start: 2024-09-21 | End: 2024-09-21

## 2024-09-21 RX ORDER — WARFARIN SODIUM 2 MG/1
2 TABLET ORAL
COMMUNITY
Start: 2022-03-28

## 2024-09-21 RX ORDER — METOPROLOL SUCCINATE 25 MG/1
25 TABLET, EXTENDED RELEASE ORAL
COMMUNITY
End: 2024-09-21 | Stop reason: ALTCHOICE

## 2024-09-21 RX ORDER — AMIODARONE HYDROCHLORIDE 200 MG/1
TABLET ORAL
Qty: 21 TABLET | Refills: 0 | Status: SHIPPED | OUTPATIENT
Start: 2024-09-21 | End: 2024-10-05

## 2024-09-21 RX ORDER — CARVEDILOL 25 MG/1
25 TABLET ORAL 2 TIMES DAILY
COMMUNITY

## 2024-09-21 RX ORDER — LOSARTAN POTASSIUM 50 MG/1
50 TABLET ORAL DAILY
COMMUNITY
Start: 2024-08-25 | End: 2024-11-23

## 2024-09-21 RX ORDER — GABAPENTIN 300 MG/1
300 CAPSULE ORAL NIGHTLY
COMMUNITY
Start: 2022-03-28

## 2024-09-21 RX ORDER — HYDROCHLOROTHIAZIDE 25 MG/1
25 TABLET ORAL DAILY
COMMUNITY
Start: 2022-03-21

## 2024-09-21 RX ADMIN — AMIODARONE HYDROCHLORIDE 300 MG: 50 INJECTION, SOLUTION INTRAVENOUS at 14:35

## 2024-09-21 ASSESSMENT — LIFESTYLE VARIABLES
HOW OFTEN DO YOU HAVE A DRINK CONTAINING ALCOHOL: NEVER
HOW MANY STANDARD DRINKS CONTAINING ALCOHOL DO YOU HAVE ON A TYPICAL DAY: PATIENT DOES NOT DRINK

## 2024-09-21 ASSESSMENT — PAIN - FUNCTIONAL ASSESSMENT: PAIN_FUNCTIONAL_ASSESSMENT: NONE - DENIES PAIN

## 2024-09-24 LAB
EKG ATRIAL RATE: 111 BPM
EKG ATRIAL RATE: 67 BPM
EKG DIAGNOSIS: NORMAL
EKG DIAGNOSIS: NORMAL
EKG P AXIS: -10 DEGREES
EKG P-R INTERVAL: 112 MS
EKG Q-T INTERVAL: 316 MS
EKG Q-T INTERVAL: 410 MS
EKG QRS DURATION: 72 MS
EKG QRS DURATION: 84 MS
EKG QTC CALCULATION (BAZETT): 433 MS
EKG QTC CALCULATION (BAZETT): 471 MS
EKG R AXIS: -6 DEGREES
EKG R AXIS: -9 DEGREES
EKG T AXIS: 19 DEGREES
EKG T AXIS: 23 DEGREES
EKG VENTRICULAR RATE: 134 BPM
EKG VENTRICULAR RATE: 67 BPM

## 2025-01-22 ENCOUNTER — HOSPITAL ENCOUNTER (EMERGENCY)
Facility: HOSPITAL | Age: 60
Discharge: HOME | End: 2025-01-23
Attending: EMERGENCY MEDICINE
Payer: COMMERCIAL

## 2025-01-22 VITALS
HEART RATE: 95 BPM | WEIGHT: 200 LBS | TEMPERATURE: 98.2 F | RESPIRATION RATE: 15 BRPM | DIASTOLIC BLOOD PRESSURE: 88 MMHG | SYSTOLIC BLOOD PRESSURE: 135 MMHG | HEIGHT: 69 IN | OXYGEN SATURATION: 94 % | BODY MASS INDEX: 29.62 KG/M2

## 2025-01-22 DIAGNOSIS — R58 BLEEDING: Primary | ICD-10-CM

## 2025-01-22 PROCEDURE — 99282 EMERGENCY DEPT VISIT SF MDM: CPT

## 2025-01-22 ASSESSMENT — LIFESTYLE VARIABLES
HOW MANY STANDARD DRINKS CONTAINING ALCOHOL DO YOU HAVE ON A TYPICAL DAY: PATIENT DOES NOT DRINK
HOW OFTEN DO YOU HAVE A DRINK CONTAINING ALCOHOL: NEVER

## 2025-01-22 ASSESSMENT — PAIN - FUNCTIONAL ASSESSMENT: PAIN_FUNCTIONAL_ASSESSMENT: NONE - DENIES PAIN

## 2025-01-23 NOTE — DISCHARGE INSTRUCTIONS
You were seen in the emergency department for slow oozing from your recent right groin cath puncture site.  After holding pressure and applying quick clot with a pressure dressing, your bleeding seems to have subsided.  Follow the directions from your cardiologist regarding bedrest.  Return to the emergency department if you have any recurrent bleeding that will not stop with direct pressure.

## 2025-01-23 NOTE — ED PROVIDER NOTES
losartan (COZAAR) 50 MG tablet Take 1 tablet by mouth dailyHistorical Med      ezetimibe (ZETIA) 10 MG tablet Take 1 tablet by mouth dailyHistorical Med             SCREENINGS               No data recorded        PHYSICAL EXAM      ED Triage Vitals [01/22/25 2253]   Encounter Vitals Group      /88      Systolic BP Percentile       Diastolic BP Percentile       Pulse 95      Respirations 15      Temp 98.2 °F (36.8 °C)      Temp Source Oral      SpO2 94 %      Weight - Scale 90.7 kg (200 lb)      Height 1.753 m (5' 9\")      Head Circumference       Peak Flow       Pain Score       Pain Loc       Pain Education       Exclude from Growth Chart               Physical Exam  Vitals and nursing note reviewed.   Constitutional:       Comments: Overweight   Cardiovascular:      Rate and Rhythm: Normal rate.      Pulses: Normal pulses.   Pulmonary:      Effort: Pulmonary effort is normal.   Abdominal:      Palpations: Abdomen is soft.   Skin:     Findings: Bruising (Slight bruising in left groin.  There is a pinpoint area with venous oozing present in left groin at puncture site.) present.   Neurological:      General: No focal deficit present.      Mental Status: She is oriented to person, place, and time.            EMERGENCY DEPARTMENT COURSE and DIFFERENTIAL DIAGNOSIS/MDM   Vitals:    Vitals:    01/22/25 2253   BP: 135/88   Pulse: 95   Resp: 15   Temp: 98.2 °F (36.8 °C)   TempSrc: Oral   SpO2: 94%   Weight: 90.7 kg (200 lb)   Height: 1.753 m (5' 9\")                    CC/HPI Summary, DDx, ED Course, and Reassessment: 59-year-old female presents with bleeding from left groin cardiac cath puncture site this started yesterday morning and has been persistent despite applying pressure.  Patient is currently on Coumadin but is not yet therapeutic after her recent cath and so is being bridged on Lovenox as well.    On exam, vitals are unremarkable.  She is awake and alert.  Lungs clear to auscultation.  Regular rate and

## 2025-01-23 NOTE — ED TRIAGE NOTES
Patient came in with a wound from an ablation,which was preformed today, that has reopened and she was not able to stop the bleeding.

## 2025-04-10 ENCOUNTER — TRANSCRIBE ORDERS (OUTPATIENT)
Facility: HOSPITAL | Age: 60
End: 2025-04-10

## 2025-04-10 DIAGNOSIS — Z12.31 SCREENING MAMMOGRAM FOR BREAST CANCER: Primary | ICD-10-CM

## 2025-05-13 ENCOUNTER — APPOINTMENT (OUTPATIENT)
Facility: HOSPITAL | Age: 60
End: 2025-05-13
Payer: COMMERCIAL

## 2025-05-13 ENCOUNTER — HOSPITAL ENCOUNTER (EMERGENCY)
Facility: HOSPITAL | Age: 60
Discharge: ANOTHER HOSPITAL | End: 2025-05-13
Attending: EMERGENCY MEDICINE
Payer: COMMERCIAL

## 2025-05-13 VITALS
BODY MASS INDEX: 30.36 KG/M2 | HEART RATE: 96 BPM | SYSTOLIC BLOOD PRESSURE: 131 MMHG | HEIGHT: 69 IN | DIASTOLIC BLOOD PRESSURE: 66 MMHG | RESPIRATION RATE: 19 BRPM | WEIGHT: 205 LBS | TEMPERATURE: 99 F | OXYGEN SATURATION: 100 %

## 2025-05-13 DIAGNOSIS — D64.9 SEVERE ANEMIA: Primary | ICD-10-CM

## 2025-05-13 DIAGNOSIS — M96.830 POSTOPERATIVE HEMORRHAGE OF MUSCULOSKELETAL STRUCTURE FOLLOWING MUSCULOSKELETAL PROCEDURE: ICD-10-CM

## 2025-05-13 LAB
ALBUMIN SERPL-MCNC: 2.6 G/DL (ref 3.5–5)
ALBUMIN/GLOB SERPL: 0.8 (ref 1.1–2.2)
ALP SERPL-CCNC: 87 U/L (ref 45–117)
ALT SERPL-CCNC: 37 U/L (ref 12–78)
ANION GAP SERPL CALC-SCNC: 6 MMOL/L (ref 2–12)
AST SERPL-CCNC: 21 U/L (ref 15–37)
BASOPHILS # BLD: 0.02 K/UL (ref 0–0.1)
BASOPHILS NFR BLD: 0.3 % (ref 0–1)
BILIRUB SERPL-MCNC: 1.2 MG/DL (ref 0.2–1)
BUN SERPL-MCNC: 19 MG/DL (ref 6–20)
BUN/CREAT SERPL: 17 (ref 12–20)
CALCIUM SERPL-MCNC: 8.2 MG/DL (ref 8.5–10.1)
CHLORIDE SERPL-SCNC: 96 MMOL/L (ref 97–108)
CO2 SERPL-SCNC: 30 MMOL/L (ref 21–32)
CREAT SERPL-MCNC: 1.11 MG/DL (ref 0.55–1.02)
DIFFERENTIAL METHOD BLD: ABNORMAL
EKG ATRIAL RATE: 99 BPM
EKG DIAGNOSIS: NORMAL
EKG P AXIS: 26 DEGREES
EKG P-R INTERVAL: 138 MS
EKG Q-T INTERVAL: 348 MS
EKG QRS DURATION: 74 MS
EKG QTC CALCULATION (BAZETT): 446 MS
EKG R AXIS: 10 DEGREES
EKG T AXIS: 28 DEGREES
EKG VENTRICULAR RATE: 99 BPM
EOSINOPHIL # BLD: 0.07 K/UL (ref 0–0.4)
EOSINOPHIL NFR BLD: 1.1 % (ref 0–0.7)
ERYTHROCYTE [DISTWIDTH] IN BLOOD BY AUTOMATED COUNT: 15.7 % (ref 11.5–14.5)
ERYTHROCYTE [SEDIMENTATION RATE] IN BLOOD: 128 MM/HR (ref 0–30)
GLOBULIN SER CALC-MCNC: 3.4 G/DL (ref 2–4)
GLUCOSE SERPL-MCNC: 129 MG/DL (ref 65–100)
HCT VFR BLD AUTO: 12 % (ref 35–47)
HGB BLD-MCNC: 3.6 G/DL (ref 11.5–16)
HISTORY CHECK: NORMAL
IMM GRANULOCYTES # BLD AUTO: 0.13 K/UL (ref 0–0.04)
IMM GRANULOCYTES NFR BLD AUTO: 2 % (ref 0–0.5)
INR PPP: 3.4 (ref 0.9–1.1)
LACTATE SERPL-SCNC: 1.7 MMOL/L (ref 0.4–2)
LDH SERPL L TO P-CCNC: 239 U/L (ref 81–246)
LYMPHOCYTES # BLD: 1.14 K/UL (ref 0.8–3.5)
LYMPHOCYTES NFR BLD: 17.2 % (ref 12–49)
MCH RBC QN AUTO: 26.5 PG (ref 26–34)
MCHC RBC AUTO-ENTMCNC: 30 G/DL (ref 30–36.5)
MCV RBC AUTO: 88.2 FL (ref 80–99)
MONOCYTES # BLD: 0.64 K/UL (ref 0–1)
MONOCYTES NFR BLD: 9.7 % (ref 5–13)
NEUTS SEG # BLD: 4.6 K/UL (ref 1.8–8)
NEUTS SEG NFR BLD: 69.7 % (ref 32–75)
NRBC # BLD: 0.1 K/UL (ref 0–0.01)
NRBC BLD-RTO: 1.5 PER 100 WBC
PLATELET # BLD AUTO: 309 K/UL (ref 150–400)
PLATELET COMMENT: ABNORMAL
PMV BLD AUTO: 9.6 FL (ref 8.9–12.9)
POTASSIUM SERPL-SCNC: 3.3 MMOL/L (ref 3.5–5.1)
PROT SERPL-MCNC: 6 G/DL (ref 6.4–8.2)
PROTHROMBIN TIME: 33.6 SEC (ref 9.2–11.2)
RBC # BLD AUTO: 1.36 M/UL (ref 3.8–5.2)
RBC MORPH BLD: ABNORMAL
RBC MORPH BLD: ABNORMAL
SODIUM SERPL-SCNC: 132 MMOL/L (ref 136–145)
TROPONIN I SERPL HS-MCNC: 8 NG/L (ref 0–51)
WBC # BLD AUTO: 6.6 K/UL (ref 3.6–11)

## 2025-05-13 PROCEDURE — 6360000004 HC RX CONTRAST MEDICATION: Performed by: EMERGENCY MEDICINE

## 2025-05-13 PROCEDURE — 36415 COLL VENOUS BLD VENIPUNCTURE: CPT

## 2025-05-13 PROCEDURE — 99285 EMERGENCY DEPT VISIT HI MDM: CPT

## 2025-05-13 PROCEDURE — 84484 ASSAY OF TROPONIN QUANT: CPT

## 2025-05-13 PROCEDURE — 87040 BLOOD CULTURE FOR BACTERIA: CPT

## 2025-05-13 PROCEDURE — 36430 TRANSFUSION BLD/BLD COMPNT: CPT

## 2025-05-13 PROCEDURE — 86901 BLOOD TYPING SEROLOGIC RH(D): CPT

## 2025-05-13 PROCEDURE — 72191 CT ANGIOGRAPH PELV W/O&W/DYE: CPT

## 2025-05-13 PROCEDURE — 86850 RBC ANTIBODY SCREEN: CPT

## 2025-05-13 PROCEDURE — 6360000002 HC RX W HCPCS: Performed by: EMERGENCY MEDICINE

## 2025-05-13 PROCEDURE — 83605 ASSAY OF LACTIC ACID: CPT

## 2025-05-13 PROCEDURE — 85652 RBC SED RATE AUTOMATED: CPT

## 2025-05-13 PROCEDURE — 96374 THER/PROPH/DIAG INJ IV PUSH: CPT

## 2025-05-13 PROCEDURE — 2580000003 HC RX 258: Performed by: EMERGENCY MEDICINE

## 2025-05-13 PROCEDURE — 86900 BLOOD TYPING SEROLOGIC ABO: CPT

## 2025-05-13 PROCEDURE — 71045 X-RAY EXAM CHEST 1 VIEW: CPT

## 2025-05-13 PROCEDURE — 83615 LACTATE (LD) (LDH) ENZYME: CPT

## 2025-05-13 PROCEDURE — 85025 COMPLETE CBC W/AUTO DIFF WBC: CPT

## 2025-05-13 PROCEDURE — 80053 COMPREHEN METABOLIC PANEL: CPT

## 2025-05-13 PROCEDURE — 85610 PROTHROMBIN TIME: CPT

## 2025-05-13 PROCEDURE — P9016 RBC LEUKOCYTES REDUCED: HCPCS

## 2025-05-13 PROCEDURE — 6370000000 HC RX 637 (ALT 250 FOR IP): Performed by: EMERGENCY MEDICINE

## 2025-05-13 PROCEDURE — 86140 C-REACTIVE PROTEIN: CPT

## 2025-05-13 PROCEDURE — 84145 PROCALCITONIN (PCT): CPT

## 2025-05-13 RX ORDER — 0.9 % SODIUM CHLORIDE 0.9 %
1000 INTRAVENOUS SOLUTION INTRAVENOUS ONCE
Status: COMPLETED | OUTPATIENT
Start: 2025-05-13 | End: 2025-05-13

## 2025-05-13 RX ORDER — ACETAMINOPHEN 500 MG
1000 TABLET ORAL
Status: COMPLETED | OUTPATIENT
Start: 2025-05-13 | End: 2025-05-13

## 2025-05-13 RX ORDER — SODIUM CHLORIDE 9 MG/ML
INJECTION, SOLUTION INTRAVENOUS PRN
Status: COMPLETED | OUTPATIENT
Start: 2025-05-13 | End: 2025-05-13

## 2025-05-13 RX ORDER — IOPAMIDOL 755 MG/ML
100 INJECTION, SOLUTION INTRAVASCULAR
Status: COMPLETED | OUTPATIENT
Start: 2025-05-13 | End: 2025-05-13

## 2025-05-13 RX ORDER — TRAMADOL HYDROCHLORIDE 50 MG/1
50 TABLET ORAL
Status: COMPLETED | OUTPATIENT
Start: 2025-05-13 | End: 2025-05-13

## 2025-05-13 RX ORDER — ONDANSETRON 2 MG/ML
4 INJECTION INTRAMUSCULAR; INTRAVENOUS ONCE
Status: COMPLETED | OUTPATIENT
Start: 2025-05-13 | End: 2025-05-13

## 2025-05-13 RX ADMIN — ONDANSETRON 4 MG: 2 INJECTION, SOLUTION INTRAMUSCULAR; INTRAVENOUS at 16:58

## 2025-05-13 RX ADMIN — TRAMADOL HYDROCHLORIDE 50 MG: 50 TABLET ORAL at 16:58

## 2025-05-13 RX ADMIN — SODIUM CHLORIDE 1000 ML: 0.9 INJECTION, SOLUTION INTRAVENOUS at 14:38

## 2025-05-13 RX ADMIN — SODIUM CHLORIDE 100 ML: 0.9 INJECTION, SOLUTION INTRAVENOUS at 19:21

## 2025-05-13 RX ADMIN — IOPAMIDOL 100 ML: 755 INJECTION, SOLUTION INTRAVENOUS at 16:26

## 2025-05-13 RX ADMIN — ACETAMINOPHEN 1000 MG: 500 TABLET ORAL at 16:58

## 2025-05-13 ASSESSMENT — PAIN DESCRIPTION - LOCATION: LOCATION: HIP

## 2025-05-13 ASSESSMENT — PAIN - FUNCTIONAL ASSESSMENT: PAIN_FUNCTIONAL_ASSESSMENT: 0-10

## 2025-05-13 ASSESSMENT — PAIN SCALES - GENERAL
PAINLEVEL_OUTOF10: 3
PAINLEVEL_OUTOF10: 4

## 2025-05-13 NOTE — ED NOTES
[facility] has assumed care of the pt. [facility] to inform receiving unit of need to call back with end time for transfusion.

## 2025-05-13 NOTE — ED NOTES
This RN to stay with pt for first 15 minutes transfusion, [facility] continues to wait. Transfusion to be set up on [facility] pump

## 2025-05-13 NOTE — ED PROVIDER NOTES
and time.   Psychiatric:         Mood and Affect: Mood normal.         Behavior: Behavior normal.          DIAGNOSTIC RESULTS   LABS:     Recent Results (from the past 24 hours)   EKG 12 Lead    Collection Time: 05/13/25  2:27 PM   Result Value Ref Range    Ventricular Rate 99 BPM    Atrial Rate 99 BPM    P-R Interval 138 ms    QRS Duration 74 ms    Q-T Interval 348 ms    QTc Calculation (Bazett) 446 ms    P Axis 26 degrees    R Axis 10 degrees    T Axis 28 degrees    Diagnosis       Normal sinus rhythm with sinus arrhythmia  Low voltage QRS  Nonspecific ST and T wave abnormality  Abnormal ECG  When compared with ECG of 21-SEP-2024 15:00,  Criteria for Inferior infarct are no longer present     Blood Culture 1    Collection Time: 05/13/25  2:40 PM    Specimen: Blood   Result Value Ref Range    Special Requests NO SPECIAL REQUESTS      Culture NO GROWTH AFTER 16 HOURS     CBC with Auto Differential    Collection Time: 05/13/25  2:40 PM   Result Value Ref Range    WBC 6.6 3.6 - 11.0 K/uL    RBC 1.36 (L) 3.80 - 5.20 M/uL    Hemoglobin 3.6 (LL) 11.5 - 16.0 g/dL    Hematocrit 12.0 (LL) 35.0 - 47.0 %    MCV 88.2 80.0 - 99.0 FL    MCH 26.5 26.0 - 34.0 PG    MCHC 30.0 30.0 - 36.5 g/dL    RDW 15.7 (H) 11.5 - 14.5 %    Platelets 309 150 - 400 K/uL    MPV 9.6 8.9 - 12.9 FL    Nucleated RBCs 1.5 (H) 0  WBC    nRBC 0.10 (H) 0.00 - 0.01 K/uL    Neutrophils % 69.7 32.0 - 75.0 %    Lymphocytes % 17.2 12.0 - 49.0 %    Monocytes % 9.7 5.0 - 13.0 %    Eosinophils % 1.1 (H) 0.0 - 0.70 %    Basophils % 0.3 0.0 - 1.0 %    Immature Granulocytes % 2.0 (H) 0.0 - 0.5 %    Neutrophils Absolute 4.60 1.80 - 8.00 K/UL    Lymphocytes Absolute 1.14 0.80 - 3.50 K/UL    Monocytes Absolute 0.64 0.00 - 1.00 K/UL    Eosinophils Absolute 0.07 0.00 - 0.40 K/UL    Basophils Absolute 0.02 0.00 - 0.10 K/UL    Immature Granulocytes Absolute 0.13 (H) 0.00 - 0.04 K/UL    Differential Type AUTOMATED      Platelet Comment ADEQUATE PLATELETS      RBC

## 2025-05-13 NOTE — ED NOTES
Pt reports increased pain, has not had pain medication since before arrival, ED Provider informed.

## 2025-05-13 NOTE — ED NOTES
TRANSFER - OUT REPORT:    Verbal report given to Radha Snowden RN on Kelli Harris  being transferred to [facility] ED for routine progression of patient care       Report consisted of patient's Situation, Background, Assessment and   Recommendations(SBAR).     Information from the following report(s) Nurse Handoff Report, ED SBAR, Adult Overview, MAR, Recent Results, Med Rec Status, Cardiac Rhythm NSR, and Quality Measures was reviewed with the receiving nurse.    [place] Fall Assessment:    Presents to emergency department  because of falls (Syncope, seizure, or loss of consciousness): No  Age > 70: No  Altered Mental Status, Intoxication with alcohol or substance confusion (Disorientation, impaired judgment, poor safety awaremess, or inability to follow instructions): No  Impaired Mobility: Ambulates or transfers with assistive devices or assistance; Unable to ambulate or transer.: Yes  Nursing Judgement: Yes          Lines:   Peripheral IV 05/13/25 Left Antecubital (Active)   Site Assessment Clean, dry & intact 05/13/25 1427   Line Status Blood return noted;Brisk blood return;Flushed;Normal saline locked 05/13/25 1427   Phlebitis Assessment No symptoms 05/13/25 1427   Infiltration Assessment 0 05/13/25 1427   Dressing Status New dressing applied;Clean, dry & intact 05/13/25 1427   Dressing Type Transparent 05/13/25 1427       Peripheral IV 05/13/25 Right Antecubital (Active)   Site Assessment Clean, dry & intact 05/13/25 1456   Line Status Blood return noted;Brisk blood return;Flushed;Normal saline locked 05/13/25 1456   Phlebitis Assessment No symptoms 05/13/25 1456   Infiltration Assessment 0 05/13/25 1456   Dressing Status New dressing applied;Clean, dry & intact 05/13/25 1456   Dressing Type Transparent 05/13/25 1456        Opportunity for questions and clarification was provided.      Patient to be transported with:  [facility] with continued monitoring, transfusions

## 2025-05-13 NOTE — ED NOTES
Pt  at bedside, has requested that pt cardiologist be consult as cardiology recommended pt come to the ED for these issues. Dr. Montoya information has been provided to ED Provider Dr. Leal. Additional warm blankets provided as requested.

## 2025-05-13 NOTE — ED NOTES
New dressing placed onto healing hip surgery wound/sutures, Primaseal Post-op dressing provided from [facility] by RN Supervisor as original dressing removed by ED Provider for assessment. Pt tolerated well.

## 2025-05-13 NOTE — ED NOTES
Per ED Provider, waiting on first unit completion and second unit blood starting before transport, delay expected. First unit still completing.

## 2025-05-13 NOTE — ED NOTES
Saline running to deliver rest of blood transfusion in tubing to pt at this time, second unit to start at it's completion.

## 2025-05-13 NOTE — ED NOTES
Additional pillows provided for pt comfort for left hip as this was replaced before right and is experiencing discomfort. Pain medication to be provided for pt when ordered.

## 2025-05-13 NOTE — ED NOTES
Pt tolerating transfusion without difficulty, rate increasing for transfusion to proceed normally at this time.

## 2025-05-13 NOTE — ED NOTES
This RN to remain at bedside for first 15 minutes of transfusion. Pt has received transfusion education, no questions at this time.

## 2025-05-13 NOTE — ED NOTES
Pt tolerating blood transfusion without difficulty or sign of reaction at this time, rate increased for transfusion to proceed normally at this time.

## 2025-05-13 NOTE — ED NOTES
Pt reports no needs at this time, has expressed she is comfortable, does not need any pain medication for the ride. Pt  at bedside.

## 2025-05-13 NOTE — ED NOTES
TRANSFER - OUT REPORT:    Verbal report given to Jacky Torres EMT-P on Kelli Harris  being transferred to [facility] ED for routine progression of patient care       Report consisted of patient's Situation, Background, Assessment and   Recommendations(SBAR).     Information from the following report(s) Nurse Handoff Report, ED SBAR, Adult Overview, MAR, Med Rec Status, Cardiac Rhythm NSR/Sinus Tach, and Quality Measures was reviewed with the receiving nurse.    [place] Fall Assessment:    Presents to emergency department  because of falls (Syncope, seizure, or loss of consciousness): No  Age > 70: No  Altered Mental Status, Intoxication with alcohol or substance confusion (Disorientation, impaired judgment, poor safety awaremess, or inability to follow instructions): No  Impaired Mobility: Ambulates or transfers with assistive devices or assistance; Unable to ambulate or transer.: Yes  Nursing Judgement: Yes          Lines:   Peripheral IV 05/13/25 Left Antecubital (Active)   Site Assessment Clean, dry & intact 05/13/25 1427   Line Status Blood return noted;Brisk blood return;Flushed;Normal saline locked 05/13/25 1427   Phlebitis Assessment No symptoms 05/13/25 1427   Infiltration Assessment 0 05/13/25 1427   Dressing Status New dressing applied;Clean, dry & intact 05/13/25 1427   Dressing Type Transparent 05/13/25 1427       Peripheral IV 05/13/25 Right Antecubital (Active)   Site Assessment Clean, dry & intact 05/13/25 1456   Line Status Blood return noted;Brisk blood return;Flushed;Normal saline locked 05/13/25 1456   Phlebitis Assessment No symptoms 05/13/25 1456   Infiltration Assessment 0 05/13/25 1456   Dressing Status New dressing applied;Clean, dry & intact 05/13/25 1456   Dressing Type Transparent 05/13/25 1456        Opportunity for questions and clarification was provided.      Patient transported with:  [facility] with continued infusion, cardiac monitoring

## 2025-05-13 NOTE — ED NOTES
Contacted [facility] regarding transfer of pt to [facility] ED, spoke with Isamar, arranged for ALS transport with cardiac monitor, blood transfusion, no isolation, and with the patient on RA. Insurance information, ht/wt, and primary diagnosis provided. Received a 1900 ETA. Malathi, ED charge RN made aware of ETA.

## 2025-05-14 LAB
ABO + RH BLD: NORMAL
BLD PROD TYP BPU: NORMAL
BLD PROD TYP BPU: NORMAL
BLOOD BANK BLOOD PRODUCT EXPIRATION DATE: NORMAL
BLOOD BANK BLOOD PRODUCT EXPIRATION DATE: NORMAL
BLOOD BANK DISPENSE STATUS: NORMAL
BLOOD BANK DISPENSE STATUS: NORMAL
BLOOD BANK ISBT PRODUCT BLOOD TYPE: 6200
BLOOD BANK ISBT PRODUCT BLOOD TYPE: 6200
BLOOD BANK UNIT TYPE AND RH: NORMAL
BLOOD BANK UNIT TYPE AND RH: NORMAL
BLOOD GROUP ANTIBODIES SERPL: NORMAL
BPU ID: NORMAL
BPU ID: NORMAL
CROSSMATCH RESULT: NORMAL
CROSSMATCH RESULT: NORMAL
CRP SERPL-MCNC: 6.7 MG/DL (ref 0–0.3)
PROCALCITONIN SERPL-MCNC: 0.13 NG/ML
SPECIMEN EXP DATE BLD: NORMAL
UNIT DIVISION: 0
UNIT DIVISION: 0
UNIT ISSUE DATE/TIME: NORMAL
UNIT ISSUE DATE/TIME: NORMAL

## 2025-05-15 ENCOUNTER — RESULTS FOLLOW-UP (OUTPATIENT)
Facility: HOSPITAL | Age: 60
End: 2025-05-15

## 2025-05-16 LAB
BACTERIA SPEC CULT: NORMAL
BACTERIA SPEC CULT: NORMAL
SERVICE CMNT-IMP: NORMAL
SERVICE CMNT-IMP: NORMAL

## 2025-05-19 LAB
BACTERIA SPEC CULT: NORMAL
BACTERIA SPEC CULT: NORMAL
EKG ATRIAL RATE: 99 BPM
EKG DIAGNOSIS: NORMAL
EKG P AXIS: 26 DEGREES
EKG P-R INTERVAL: 138 MS
EKG Q-T INTERVAL: 348 MS
EKG QRS DURATION: 74 MS
EKG QTC CALCULATION (BAZETT): 446 MS
EKG R AXIS: 10 DEGREES
EKG T AXIS: 28 DEGREES
EKG VENTRICULAR RATE: 99 BPM
SERVICE CMNT-IMP: NORMAL
SERVICE CMNT-IMP: NORMAL